# Patient Record
Sex: MALE | Race: WHITE | NOT HISPANIC OR LATINO
[De-identification: names, ages, dates, MRNs, and addresses within clinical notes are randomized per-mention and may not be internally consistent; named-entity substitution may affect disease eponyms.]

---

## 2017-05-02 PROBLEM — Z00.00 ENCOUNTER FOR PREVENTIVE HEALTH EXAMINATION: Status: ACTIVE | Noted: 2017-05-02

## 2017-05-03 ENCOUNTER — APPOINTMENT (OUTPATIENT)
Dept: OTOLARYNGOLOGY | Facility: CLINIC | Age: 42
End: 2017-05-03

## 2017-05-03 VITALS — BODY MASS INDEX: 27.04 KG/M2 | HEIGHT: 73 IN | WEIGHT: 204 LBS

## 2017-05-03 VITALS — DIASTOLIC BLOOD PRESSURE: 74 MMHG | SYSTOLIC BLOOD PRESSURE: 126 MMHG

## 2017-05-03 DIAGNOSIS — R13.19 OTHER DYSPHAGIA: ICD-10-CM

## 2017-05-03 DIAGNOSIS — Z80.9 FAMILY HISTORY OF MALIGNANT NEOPLASM, UNSPECIFIED: ICD-10-CM

## 2017-05-03 DIAGNOSIS — Z78.9 OTHER SPECIFIED HEALTH STATUS: ICD-10-CM

## 2017-05-03 DIAGNOSIS — Z86.39 PERSONAL HISTORY OF OTHER ENDOCRINE, NUTRITIONAL AND METABOLIC DISEASE: ICD-10-CM

## 2017-05-03 DIAGNOSIS — Z83.49 FAMILY HISTORY OF OTHER ENDOCRINE, NUTRITIONAL AND METABOLIC DISEASES: ICD-10-CM

## 2017-05-03 DIAGNOSIS — Z92.3 PERSONAL HISTORY OF IRRADIATION: ICD-10-CM

## 2017-05-03 DIAGNOSIS — H90.5 UNSPECIFIED SENSORINEURAL HEARING LOSS: ICD-10-CM

## 2017-06-23 ENCOUNTER — APPOINTMENT (OUTPATIENT)
Dept: OTOLARYNGOLOGY | Facility: HOSPITAL | Age: 42
End: 2017-06-23

## 2019-06-13 ENCOUNTER — APPOINTMENT (OUTPATIENT)
Dept: OTOLARYNGOLOGY | Facility: CLINIC | Age: 44
End: 2019-06-13
Payer: COMMERCIAL

## 2019-06-13 VITALS
SYSTOLIC BLOOD PRESSURE: 132 MMHG | OXYGEN SATURATION: 99 % | HEART RATE: 97 BPM | DIASTOLIC BLOOD PRESSURE: 80 MMHG | TEMPERATURE: 98.5 F

## 2019-06-13 VITALS — SYSTOLIC BLOOD PRESSURE: 123 MMHG | OXYGEN SATURATION: 97 % | HEART RATE: 77 BPM | DIASTOLIC BLOOD PRESSURE: 80 MMHG

## 2019-06-13 DIAGNOSIS — Z85.47 PERSONAL HISTORY OF MALIGNANT NEOPLASM OF TESTIS: ICD-10-CM

## 2019-06-13 PROCEDURE — 31575 DIAGNOSTIC LARYNGOSCOPY: CPT

## 2019-06-13 PROCEDURE — 99214 OFFICE O/P EST MOD 30 MIN: CPT | Mod: 25

## 2019-06-20 ENCOUNTER — APPOINTMENT (OUTPATIENT)
Dept: PULMONOLOGY | Facility: CLINIC | Age: 44
End: 2019-06-20
Payer: COMMERCIAL

## 2019-06-20 VITALS
TEMPERATURE: 98 F | RESPIRATION RATE: 12 BRPM | SYSTOLIC BLOOD PRESSURE: 110 MMHG | BODY MASS INDEX: 27.04 KG/M2 | WEIGHT: 204 LBS | HEART RATE: 69 BPM | HEIGHT: 73 IN | OXYGEN SATURATION: 98 % | DIASTOLIC BLOOD PRESSURE: 90 MMHG

## 2019-06-20 PROCEDURE — 99204 OFFICE O/P NEW MOD 45 MIN: CPT

## 2019-06-21 RX ORDER — LISINOPRIL 10 MG/1
10 TABLET ORAL
Refills: 0 | Status: ACTIVE | COMMUNITY

## 2019-06-21 RX ORDER — IBUPROFEN 800 MG/1
TABLET, FILM COATED ORAL
Refills: 0 | Status: DISCONTINUED | COMMUNITY
End: 2019-06-21

## 2019-06-21 RX ORDER — LAMOTRIGINE 100 MG/1
100 TABLET ORAL
Refills: 0 | Status: ACTIVE | COMMUNITY

## 2019-06-21 RX ORDER — METHYLPHENIDATE HYDROCHLORIDE 5 MG/1
5 TABLET ORAL
Refills: 0 | Status: DISCONTINUED | COMMUNITY
End: 2019-06-21

## 2019-06-22 NOTE — PHYSICAL EXAM
[General Appearance - Well Developed] : well developed [General Appearance - Well Nourished] : well nourished [III] : III [Abnormal Walk] : normal gait [Nail Clubbing] : no clubbing of the fingernails [Cyanosis, Localized] : no localized cyanosis [] : no rash [No Focal Deficits] : no focal deficits [Oriented To Time, Place, And Person] : oriented to person, place, and time [Heart Sounds] : normal S1 and S2 [Murmurs] : no murmurs present [FreeTextEntry1] : no articular manifestations of CVD [FreeTextEntry2] : no pedal edema ; mild tightness of calf muscles on right, no cords, negative Homen sign

## 2019-06-22 NOTE — ASSESSMENT
[FreeTextEntry1] : 6-20-19:\par It was a pleasure to meet Renny in consultation today. His respiratory issues are summarized:\par \par 1. Pulmonary emboli and thyroid surgery\par Renny woke up with new shortness of breath in March 2019 that continued for one month. He then had acute right-sided chest pain and went to urgent care, then [facility] ED. He was diagnosed with B/L pulmonary emboli on a CTA chest on 4/28/19.  He was started on anticoagulation and is currently on Eliquis 5 mg BID and follows with Dr. Chacko in hematology. \par He is discussing thryroidectomy with Dr. Varela.  Renny and I have discussed the risks of surgery in the setting of PE and anticoagulation.  For elective thyroidectomy, I would recommend that he hold off on getting the surgery until he has been on anticoagulation for at least 6 months.  Then, I would recommend that he transition to low molecular weight heparin perioperatively. We would stop LMWH the night before the surgery then restart him on LMWH within 12-24 hours post-operatively, depending on his bleeding risk. On post-op day 3 or 4, I would then transition LMWH back to Eliquis. \par He states the Dr. Chacko is planning to keep him on Eliquis for a total of 9 months then transition him to ASA.  He also notes that Dr. Chacko plans to do a hypercoagulability workup after he discontinues Eliquis. \par I typically like to keep patients on anticoagulation for one year, as there has been data to show that the risk of PE increased if anticoagulation is stopped after 6-12 months. \par He states the he is getting blood work done with Dr. Christian later today, so we will send a list of labs we'd like checked, including d-dimer, CBC, antiphospholipid Ab, anticardiolipin Ab, Factor V Leiden, and homocysteine and will request these to be done with his hematologist.\par We will also get an echo to assess his right-sided pressures, as well as a PFT and 6MWT.     I will call Dr. Chacko to discuss plan. \par \par 2. Opacities on CTA\par Renny's CTA from 4/28/19 shows that there is subpleural opacity with spiculation noted in the LLL adjacent to the diaphragm. I believe this is likely scar tissue from the pneumonia with pleurisy diagnosed in 2017. There is also a subpleural opacity at the RLL base 9.7 mm x 5.3 mm, mean 7.5 mm. There is ground-glass opacity at the right base posteriorly. We will follow this with with a low-dose CT chest in 6 months (end of October 2019). \par \par 3. RML lung cyst\par There is a 2.7 cm right middle lobe cyst seen on his CTA from 4/28/19. We will follow this on his October CT chest.\par \par 4. Health maintenance\par Received pneumococcal vaccination on 4/30/19 as per pt. \par \par Return 4 months

## 2019-06-22 NOTE — DISCUSSION/SUMMARY
[FreeTextEntry1] : ATTENDING SUMMARY \par 6-20-19 \par -no pallor or icterus \par -no oral thrush, mildly deviated nasal septum, narrowing of nasal passages, no discharge\par -P2 not loud or split\par -good air entry bilaterally, no wheezing, rhonchi or crackles \par -no articular manifestations of CVD\par -no pedal edema ; mild tightness of calf muscles on right, no cords, negative Homen sign\par

## 2019-06-22 NOTE — CONSULT LETTER
[Dear  ___] : Dear ~ASPEN, [Consult Letter:] : I had the pleasure of evaluating your patient, [unfilled]. [Please see my note below.] : Please see my note below. [Consult Closing:] : Thank you very much for allowing me to participate in the care of this patient.  If you have any questions, please do not hesitate to contact me. [Sincerely,] : Sincerely, [DrAmmon  ___] : Dr. GLASER [FreeTextEntry2] : Konstantin Varela MD [FreeTextEntry3] : Albin Luo MD

## 2019-06-22 NOTE — END OF VISIT
[>50% of Time Spent on Counseling and Coordination of Care for  ___] : Greater than 50% of the encounter time was spent on counseling and coordination of care for [unfilled] [Time Spent: ___ minutes] : I have spent [unfilled] minutes of face to face time with the patient [FreeTextEntry3] : All medical record entries made by the Scribe were at my, Dr. Albin Lou's direction and personally dictated by me.   I have reviewed the chart and agree that the record accurately reflects my personal performance of the history, physical exam, assessment and plan. I have also personally directed, reviewed, and agreed with the chart.

## 2019-06-22 NOTE — HISTORY OF PRESENT ILLNESS
[FreeTextEntry1] : Pt is a 44 yo M with PMH ADD, Lyme disease, multinodular goiter, pneumonia with pleurisy (2017), testicular cancer (2004), Schatsky's ring/gastritis (1998). Diagnosed with PE in April 2019. \par \milagros Woke up one morning at end of march and was short of breath. Throughout the next month was more short of breath for the next month. \par He had multiple ECGs and CXRs that were fine. Occasional CP when he was exerting himself. \milagros Then had acute CP, went to urgent care first, eventually went to ED ([facility]) and was diagnosed with PE in the ED on 4/28/19. \milagros Prior to diagnosis, was traveling to [place] every week, and also had trips to [facility] and [facility] in the months leading up to his diagnosis. \par He did have some lower extremity joint pain, but no pain or swelling of the calves.\par Denies testosterone supplementation.\par No previous blood clots.\par No FHx blood clots.\par Follows with Dr. Chacko in hematology, who is overseeing his anticoagulation.  He is on Eliquis 5 mg BID.  \par \par Dr. Varela is planning for thyroidectomy for multinodular goiter. Surgical date not yet set but pt will require clearance.

## 2019-10-08 ENCOUNTER — FORM ENCOUNTER (OUTPATIENT)
Age: 44
End: 2019-10-08

## 2019-10-08 ENCOUNTER — APPOINTMENT (OUTPATIENT)
Dept: OTOLARYNGOLOGY | Facility: CLINIC | Age: 44
End: 2019-10-08
Payer: COMMERCIAL

## 2019-10-08 VITALS
OXYGEN SATURATION: 98 % | SYSTOLIC BLOOD PRESSURE: 126 MMHG | HEART RATE: 87 BPM | DIASTOLIC BLOOD PRESSURE: 83 MMHG | TEMPERATURE: 98.4 F

## 2019-10-08 PROCEDURE — 99214 OFFICE O/P EST MOD 30 MIN: CPT | Mod: 25

## 2019-10-08 PROCEDURE — 31575 DIAGNOSTIC LARYNGOSCOPY: CPT

## 2019-10-08 NOTE — CONSULT LETTER
[Dear  ___] : Dear  [unfilled], [Please see my note below.] : Please see my note below. [Consult Closing:] : Thank you very much for allowing me to participate in the care of this patient.  If you have any questions, please do not hesitate to contact me. [Consult Letter:] : I had the pleasure of evaluating your patient, [unfilled]. [Sincerely,] : Sincerely, [DrAmmon  ___] : Dr. GLASER [[facility]] : [facility]  [Konstantin Varela MD, FACS] : Konstantin Varela MD, FACS [Director] : Director [[facility]] : [facility] [[facility]] : [facility]  [FreeTextEntry3] : \par Konstantin Varela M.D., FACS, ECNU\par Director [facility]\par [facility]\par Certified in Thyroid/Parathyroid/Neck Ultrasound, ECNU/ AIUM\par \par , Department of Otolaryngology\par [facility] [employer] at [facility]\par

## 2019-10-08 NOTE — HISTORY OF PRESENT ILLNESS
[de-identified] : Renny is a generally healthy 41-year-old male  who had a left thyroid lobectomy at the age of 15, for a presumed goiter and not malignant. soon after his thyroidectomy he started taking Levothyroxine for unclear reasons but over the years has continued taking it and there has been progressive growth of the right thyroid lobe with increasing symptoms related to pressure with a globus sensation.  He denies true dysphagia but had an upper GI endoscopy for severe GERD 2 years ago and found to have a Schatzki's ring that was dilated and he had another dilation 6 months ago. He denies recent voice changes, significant shortness of breath other than related to deconditioning. He snores but denies excessive daytime sleepiness.  He is taking Ritalin for ADD.  He is able to lie flat without dyspnea.  He denies any pain in the neck but has a sense of pressure discomfort that occurs regularly ans especially when swallowing.  His weight has been stable and he is not sure if there had been more growth of the gland after stopping LT4 in 2015 for ~ 1 year.  His TSH has become more suppressed with the growth and is now near the lower limit of normal with a normal free T4 on last blood testing.   He noted that after his last US of the neck on 03/28/17, he had exacerbation of symptoms with severe pressure sensation and was found to have a right thyroid lobe measuring 9.5 x 2.9 x 5 CM. There is a probable cluster of solid and cystic nodules in the mid to lower pole measuring 3.2 x 2.5 x 2.7 CM as well as an exophytic lower pole nodule extending to the left of midline measuring 7 x 3.3 x 6.8 CM. There has been significant growth since he was last evaluated previously measuring 5.9 x 3.4 for the cluster of nodules in the lower pole and 3.6 x 2.6 CM for the exophytic lower pole nodule. his mother has thyroid nodules that are being followed and his grandmother maternal grandmother had her thyroid removed presumably for goiter but he is not aware of any family history of thyroid cancer. He has no known radiation exposure is ankle up in [facility] not near a nuclear power plant.  However, he was treated for testicular cancer in 2004 with a radical orchiectomy and pelvic radiation.  He has congenital deafness AS.   \par  [FreeTextEntry1] : Renny returns after he was scheduled to have a completion thyroidectomy for a right sided goiter in 2017 but needed a cardiac stress test and this took longer than expected.  He ultimately deferred the operation because of a busy travel work schedule.  He developed SOB in April and was found to have multiple pulmonary emboli of uncertain etiology on CT angio. 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  He was ultimately treated with anticoagulants (Elaquis).  His stress test in 2017 was normal.  He also is being treated for Lyme disease with oral antibiotics.  He had a full hematologic w/u and no coagulopathy identified.   He is also about to be evaluated by a pulmonologist.  A metastatic disease w/u (for testicular cancer) with cross-sectional imaging is in progress. CT scan tomorrow.  He currently denies SOB but continues to have a globus sensation. He has not had a f/u neck CT or ultrasound. He is still having a globus sensation and describes a pressure sensation in the throat.

## 2019-10-08 NOTE — REASON FOR VISIT
[FreeTextEntry2] : growth of the right thyroid lobe after prior partial thyroidectomy, substernal extension and compressive symptoms.  [FreeTextEntry1] : Referred by Diego Neri MD Endocrinologist,  PCP Brayden Hess MD

## 2019-10-08 NOTE — CONSULT LETTER
[Dear  ___] : Dear  [unfilled], [Consult Letter:] : I had the pleasure of evaluating your patient, [unfilled]. [Please see my note below.] : Please see my note below. [Consult Closing:] : Thank you very much for allowing me to participate in the care of this patient.  If you have any questions, please do not hesitate to contact me. [Sincerely,] : Sincerely, [DrAmmon  ___] : Dr. GLASER [Konstantin Varela MD, FACS] : Konstantin Varela MD, FACS [[facility]] : [facility]  [Director] : Director [[facility]] : [facility] [[facility]] : [facility]  [FreeTextEntry3] : \par Konstantin Varela M.D., FACS, ECNU\par Director [facility]\par [facility]\par Certified in Thyroid/Parathyroid/Neck Ultrasound, ECNU/ AIUM\par \par , Department of Otolaryngology\par [facility] at [facility]\par

## 2019-10-08 NOTE — DATA REVIEWED
[de-identified] : see history of present illness [de-identified] : see history of present illness [de-identified] : Discharge notes from [facility] [de-identified] : see HPI

## 2019-10-08 NOTE — DATA REVIEWED
[de-identified] : see history of present illness [de-identified] : see history of present illness [de-identified] : see HPI [de-identified] : Discharge notes from [facility]

## 2019-10-08 NOTE — PROCEDURE
[Image(s) Captured] : image(s) captured and filed [Unable to Cooperate with Mirror] : patient unable to cooperate with mirror [Gag Reflex] : gag reflex preventing mirror examination [Complicated Symptoms] : complicated symptoms requiring more thorough examination than provided by mirror [Globus] : globus [Topical Lidocaine] : topical lidocaine [Flexible Endoscope] : examined with the flexible endoscope [Serial Number: ___] : Serial Number: [unfilled] [de-identified] : The nasal septum is minimally deviated to the left. There are no masses or polyps and the nasal mucosa and secretions are normal. The choanae and posterior nasopharynx are normal without masses or drainage. The Eustachian tube orifices appear patent. The pharynx, including the posterior and lateral pharyngeal walls, the vallecula and base of tongue are normal without ulcerations, lesions or masses. The hypopharynx including the pyriform sinuses open well without pooling of secretions, mucosal lesions or masses. The supraglottic larynx including the epiglottis, petiole, glossoepiglottic folds and pharyngoepiglottic folds are normal without mucosal lesions, ulcerations or masses. The glottis reveals normal false vocal folds. The true vocal folds are glistening white, tense and of equal length, without paralysis, having symmetric mobility on adduction and abduction. There are no mucosal lesions, nodules, cysts, erythroplasia or leukoplakia. The posterior cricoid area has healthy pink mucosa in the interarytenoid area and esophageal inlet. There is slight thickening/edema of the interarytenoid mucosa or erythema suggestive of posterior laryngitis from laryngopharyngeal acid reflux disease. The trachea is pushed posteriorly but without narrowing in the immediate subglottis. His upper airway is patent. \par  [de-identified] : a goiter with substernal extension and compressive symptoms involving the right thyroid lobe.

## 2019-10-08 NOTE — CONSULT LETTER
[Dear  ___] : Dear  [unfilled], [Please see my note below.] : Please see my note below. [Consult Letter:] : I had the pleasure of evaluating your patient, [unfilled]. [Consult Closing:] : Thank you very much for allowing me to participate in the care of this patient.  If you have any questions, please do not hesitate to contact me. [Sincerely,] : Sincerely, [DrAmmon  ___] : Dr. GLASER [[facility]] : [facility]  [Konstantin Varela MD, FACS] : Konstantin Varela MD, FACS [Director] : Director [[facility]] : [facility]  [[facility]] : [facility] [FreeTextEntry3] : \par Konstantin Varela M.D., FACS, ECNU\par Director [facility]\par [facility] [place] [facility]\par Certified in Thyroid/Parathyroid/Neck Ultrasound, ECNU/ AIUM\par \par , Department of Otolaryngology\par [facility] [employer] [facility] at [facility]\par

## 2019-10-08 NOTE — DATA REVIEWED
[de-identified] : see history of present illness [de-identified] : see history of present illness [de-identified] : see HPI

## 2019-10-08 NOTE — HISTORY OF PRESENT ILLNESS
[de-identified] : Renny is a generally healthy 41-year-old male  who had a left thyroid lobectomy at the age of 15, for a presumed goiter and not malignant. soon after his thyroidectomy he started taking Levothyroxine for unclear reasons but over the years has continued taking it and there has been progressive growth of the right thyroid lobe with increasing symptoms related to pressure with a globus sensation.  He denies true dysphagia but had an upper GI endoscopy for severe GERD 2 years ago and found to have a Schatzki's ring that was dilated and he had another dilation 6 months ago. He denies recent voice changes, significant shortness of breath other than related to deconditioning. He snores but denies excessive daytime sleepiness.  He is taking Ritalin for ADD.  He is able to lie flat without dyspnea.  He denies any pain in the neck but has a sense of pressure discomfort that occurs regularly ans especially when swallowing.  His weight has been stable and he is not sure if there had been more growth of the gland after stopping LT4 in 2015 for ~ 1 year.  His TSH has become more suppressed with the growth and is now near the lower limit of normal with a normal free T4 on last blood testing.   He noted that after his last US of the neck on 03/28/17, he had exacerbation of symptoms with severe pressure sensation and was found to have a right thyroid lobe measuring 9.5 x 2.9 x 5 CM. There is a probable cluster of solid and cystic nodules in the mid to lower pole measuring 3.2 x 2.5 x 2.7 CM as well as an exophytic lower pole nodule extending to the left of midline measuring 7 x 3.3 x 6.8 CM. There has been significant growth since he was last evaluated previously measuring 5.9 x 3.4 for the cluster of nodules in the lower pole and 3.6 x 2.6 CM for the exophytic lower pole nodule. his mother has thyroid nodules that are being followed and his grandmother maternal grandmother had her thyroid removed presumably for goiter but he is not aware of any family history of thyroid cancer. He has no known radiation exposure is ankle up in [facility] not near a nuclear power plant.  However, he was treated for testicular cancer in 2004 with a radical orchiectomy and pelvic radiation.  He has congenital deafness AS.   \par  [FreeTextEntry1] : Renny returns after he was scheduled to have a completion thyroidectomy for a right sided goiter in 2017 but needed a cardiac stress test and this took longer than expected.  He ultimately deferred the operation because of a busy travel work schedule.  He developed SOB in April and was found to have multiple pulmonary emboli of uncertain etiology on CT angio. 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  He was ultimately treated with anticoagulants (Elaquis).  His stress test in 2017 was normal.  He also is being treated for Lyme disease with oral antibiotics.  He had a full hematologic w/u and no coagulopathy identified.   He is also about to be evaluated by a pulmonologist.  A metastatic disease w/u (for testicular cancer) with cross-sectional imaging is in progress. CT scan tomorrow.  He currently denies SOB but continues to have a globus sensation. He has not had a f/u neck CT or ultrasound. He is still having a globus sensation and describes a pressure sensation in the throat.

## 2019-10-08 NOTE — REASON FOR VISIT
[Initial Consultation] : an initial consultation for [FreeTextEntry2] : growth of the right thyroid lobe after prior partial thyroidectomy, substernal extension and compressive symptoms.  [FreeTextEntry1] : Referred by Diego Neri MD Endocrinologist,  PCP Brayden Hess MD

## 2019-10-08 NOTE — PROCEDURE
[Image(s) Captured] : image(s) captured and filed [Complicated Symptoms] : complicated symptoms requiring more thorough examination than provided by mirror [Globus] : globus [Topical Lidocaine] : topical lidocaine [Flexible Endoscope] : examined with the flexible endoscope [Serial Number: ___] : Serial Number: [unfilled] [Unable to Cooperate with Mirror] : patient unable to cooperate with mirror [Gag Reflex] : gag reflex preventing mirror examination [de-identified] : The nasal septum is minimally deviated to the left. There are no masses or polyps and the nasal mucosa and secretions are normal. The choanae and posterior nasopharynx are normal without masses or drainage. The Eustachian tube orifices appear patent. The pharynx, including the posterior and lateral pharyngeal walls, the vallecula and base of tongue are normal without ulcerations, lesions or masses. The hypopharynx including the pyriform sinuses open well without pooling of secretions, mucosal lesions or masses. The supraglottic larynx including the epiglottis, petiole, glossoepiglottic folds and pharyngoepiglottic folds are normal without mucosal lesions, ulcerations or masses. The glottis reveals normal false vocal folds. The true vocal folds are glistening white, tense and of equal length, without paralysis, having symmetric mobility on adduction and abduction. There are no mucosal lesions, nodules, cysts, erythroplasia or leukoplakia. The posterior cricoid area has healthy pink mucosa in the interarytenoid area and esophageal inlet. There is mild thickening/edema of the interarytenoid mucosa or erythema suggestive of posterior laryngitis from laryngopharyngeal acid reflux disease. The trachea is pushed posteriorly but without narrowing in the immediate subglottis.\par  [de-identified] : a goiter with substernal extension and compressive symptoms  involving the right thyroid lobe.

## 2019-10-08 NOTE — PROCEDURE
[Image(s) Captured] : image(s) captured and filed [Complicated Symptoms] : complicated symptoms requiring more thorough examination than provided by mirror [Topical Lidocaine] : topical lidocaine [Globus] : globus [Flexible Endoscope] : examined with the flexible endoscope [Serial Number: ___] : Serial Number: [unfilled] [Gag Reflex] : gag reflex preventing mirror examination [Unable to Cooperate with Mirror] : patient unable to cooperate with mirror [de-identified] : The nasal septum is minimally deviated to the left. There are no masses or polyps and the nasal mucosa and secretions are normal. The choanae and posterior nasopharynx are normal without masses or drainage. The Eustachian tube orifices appear patent. The pharynx, including the posterior and lateral pharyngeal walls, the vallecula and base of tongue are normal without ulcerations, lesions or masses. The hypopharynx including the pyriform sinuses open well without pooling of secretions, mucosal lesions or masses. The supraglottic larynx including the epiglottis, petiole, glossoepiglottic folds and pharyngoepiglottic folds are normal without mucosal lesions, ulcerations or masses. The glottis reveals normal false vocal folds. The true vocal folds are glistening white, tense and of equal length, without paralysis, having symmetric mobility on adduction and abduction. There are no mucosal lesions, nodules, cysts, erythroplasia or leukoplakia. The posterior cricoid area has healthy pink mucosa in the interarytenoid area and esophageal inlet. There is mild thickening/edema of the interarytenoid mucosa or erythema suggestive of posterior laryngitis from laryngopharyngeal acid reflux disease. The trachea is pushed posteriorly but without narrowing in the immediate subglottis.\par  [de-identified] : a goiter with substernal extension and compressive symptoms  involving the right thyroid lobe.

## 2019-10-08 NOTE — HISTORY OF PRESENT ILLNESS
[de-identified] : Renny is a generally healthy 41-year-old male  who had a left thyroid lobectomy at the age of 15, for a presumed goiter and not malignant. soon after his thyroidectomy he started taking Levothyroxine for unclear reasons but over the years has continued taking it and there has been progressive growth of the right thyroid lobe with increasing symptoms related to pressure with a globus sensation.  He denies true dysphagia but had an upper GI endoscopy for severe GERD 2 years ago and found to have a Schatzki's ring that was dilated and he had another dilation 6 months ago. He denies recent voice changes, significant shortness of breath other than related to deconditioning. He snores but denies excessive daytime sleepiness.  He is taking Ritalin for ADD.  He is able to lie flat without dyspnea.  He denies any pain in the neck but has a sense of pressure discomfort that occurs regularly ans especially when swallowing.  His weight has been stable and he is not sure if there had been more growth of the gland after stopping LT4 in 2015 for ~ 1 year.  His TSH has become more suppressed with the growth and is now near the lower limit of normal with a normal free T4 on last blood testing.   He noted that after his last US of the neck on 03/28/17, he had exacerbation of symptoms with severe pressure sensation and was found to have a right thyroid lobe measuring 9.5 x 2.9 x 5 CM. There is a probable cluster of solid and cystic nodules in the mid to lower pole measuring 3.2 x 2.5 x 2.7 CM as well as an exophytic lower pole nodule extending to the left of midline measuring 7 x 3.3 x 6.8 CM. There has been significant growth since he was last evaluated previously measuring 5.9 x 3.4 for the cluster of nodules in the lower pole and 3.6 x 2.6 CM for the exophytic lower pole nodule. his mother has thyroid nodules that are being followed and his grandmother maternal grandmother had her thyroid removed presumably for goiter but he is not aware of any family history of thyroid cancer. He has no known radiation exposure is ankle up in [facility] not near a nuclear power plant.  However, he was treated for testicular cancer in 2004 with a radical orchiectomy and pelvic radiation.  He has congenital deafness AS.   \par  [FreeTextEntry1] : Renny returns after he was scheduled to have a completion thyroidectomy for a right sided goiter in 2017 but needed a cardiac stress test and this took longer than expected.  He ultimately deferred the operation because of a busy travel work schedule.  He developed SOB in April and was found to have multiple pulmonary emboli of uncertain etiology on CT angio. 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  He was ultimately treated with anticoagulants (Elaquis).  His stress test in 2017 was normal.  He also was treated for Lyme disease with oral antibiotics.  He had a full hematologic w/u and no coagulopathy identified.  He is saw a pulmonologist to rule out metastatic disease w/u (for testicular cancer) with CT cross-sectional imaging.  There were no concerning findings and he is being monitored.  He is having PFTs soon to determine if there is extrinsic obstruction.   He currently denies SOB but continues to have a globus sensation.  He is still having a globus sensation and describes a pressure sensation in the throat. He has intermittent GERD managed with a PPI.

## 2019-10-09 ENCOUNTER — RESULT REVIEW (OUTPATIENT)
Age: 44
End: 2019-10-09

## 2019-10-09 ENCOUNTER — OUTPATIENT (OUTPATIENT)
Dept: OUTPATIENT SERVICES | Facility: HOSPITAL | Age: 44
LOS: 1 days | End: 2019-10-09
Payer: COMMERCIAL

## 2019-10-09 DIAGNOSIS — I26.99 OTHER PULMONARY EMBOLISM WITHOUT ACUTE COR PULMONALE: ICD-10-CM

## 2019-10-09 PROCEDURE — 93306 TTE W/DOPPLER COMPLETE: CPT

## 2019-10-09 PROCEDURE — 93306 TTE W/DOPPLER COMPLETE: CPT | Mod: 26

## 2019-11-12 ENCOUNTER — FORM ENCOUNTER (OUTPATIENT)
Age: 44
End: 2019-11-12

## 2019-11-13 ENCOUNTER — RESULT REVIEW (OUTPATIENT)
Age: 44
End: 2019-11-13

## 2019-11-13 ENCOUNTER — APPOINTMENT (OUTPATIENT)
Dept: CT IMAGING | Facility: CLINIC | Age: 44
End: 2019-11-13
Payer: COMMERCIAL

## 2019-11-13 ENCOUNTER — OUTPATIENT (OUTPATIENT)
Dept: OUTPATIENT SERVICES | Facility: HOSPITAL | Age: 44
LOS: 1 days | End: 2019-11-13

## 2019-11-13 PROCEDURE — 71250 CT THORAX DX C-: CPT | Mod: 26

## 2019-11-21 ENCOUNTER — APPOINTMENT (OUTPATIENT)
Dept: PULMONOLOGY | Facility: CLINIC | Age: 44
End: 2019-11-21
Payer: COMMERCIAL

## 2019-11-21 VITALS
SYSTOLIC BLOOD PRESSURE: 102 MMHG | HEART RATE: 104 BPM | DIASTOLIC BLOOD PRESSURE: 80 MMHG | BODY MASS INDEX: 27.65 KG/M2 | OXYGEN SATURATION: 98 % | HEIGHT: 73 IN | TEMPERATURE: 98.1 F | WEIGHT: 208.6 LBS

## 2019-11-21 DIAGNOSIS — Z23 ENCOUNTER FOR IMMUNIZATION: ICD-10-CM

## 2019-11-21 DIAGNOSIS — R09.81 NASAL CONGESTION: ICD-10-CM

## 2019-11-21 PROCEDURE — 99215 OFFICE O/P EST HI 40 MIN: CPT | Mod: 25

## 2019-11-21 PROCEDURE — 94060 EVALUATION OF WHEEZING: CPT

## 2019-11-21 PROCEDURE — G0008: CPT

## 2019-11-21 PROCEDURE — 94727 GAS DIL/WSHOT DETER LNG VOL: CPT

## 2019-11-21 PROCEDURE — 94729 DIFFUSING CAPACITY: CPT

## 2019-11-21 PROCEDURE — 90686 IIV4 VACC NO PRSV 0.5 ML IM: CPT

## 2019-11-21 RX ORDER — FLUTICASONE PROPIONATE 50 UG/1
50 SPRAY, METERED NASAL TWICE DAILY
Qty: 3 | Refills: 1 | Status: ACTIVE | COMMUNITY
Start: 2019-11-21 | End: 1900-01-01

## 2019-11-23 NOTE — HISTORY OF PRESENT ILLNESS
[Difficulty Breathing During Exertion] : denies dyspnea on exertion [Feelings Of Weakness On Exertion] : denies exercise intolerance [Cough] : denies coughing [Wheezing] : denies wheezing [Chest Pain Or Discomfort] : denies chest pain [Fever] : denies fever [FreeTextEntry1] : Pt is a 44 yo M with PMH ADD, Lyme disease, multinodular goiter, pneumonia with pleurisy (2017), testicular cancer (2004), Schatsky's ring/gastritis (1998). Diagnosed with PE in April 2019. \par \par Initial visit 6/20/19: Woke up one morning at end of march and was short of breath. Throughout the next month was more short of breath for the next month. \par He had multiple ECGs and CXRs that were fine. Occasional CP when he was exerting himself. \par Then had acute CP, went to urgent care first, eventually went to ED ([facility]) and was diagnosed with PE in the ED on 4/28/19. \par Prior to diagnosis, was traveling to [place] every week, and also had trips to [facility] in the months leading up to his diagnosis. \par He did have some lower extremity joint pain, but no pain or swelling of the calves.\par Denies testosterone supplementation.\par No previous blood clots.\par No FHx blood clots.\par Follows with Dr. Chacko in hematology, who is overseeing his anticoagulation.  He is on Eliquis 5 mg BID.  \par Dr. Varela is planning for thyroidectomy for multinodular goiter. Surgical date not yet set but pt will require clearance. \par \par 11/21/19:\par Denies SOB. He can walk to the 8th floor quickly, will be winded at the top. He was not able to do this as easily in the past. He can walk as much as he'd like on a flat surface before he gets winded; his back is more of a limitation to exercise.  He is undergoing PT. Denies chest pain. \par Denies cough, chest tightness, wheezing, fever, respiratory tract infections. \par

## 2019-11-23 NOTE — CONSULT LETTER
[Dear  ___] : Dear ~ASPEN, [Consult Letter:] : I had the pleasure of evaluating your patient, [unfilled]. [Please see my note below.] : Please see my note below. [Consult Closing:] : Thank you very much for allowing me to participate in the care of this patient.  If you have any questions, please do not hesitate to contact me. [Sincerely,] : Sincerely, [DrAmmon  ___] : Dr. GLASER [FreeTextEntry2] : Konstantin Varela MD [FreeTextEntry3] : Albin Lou MD

## 2019-11-23 NOTE — PROCEDURE
[FreeTextEntry1] : PFT and 6MWT 19:\par FVC: 6.34 L (115%) --> 6.58 L (120%)\par FEV1: 5.26 L (118%) --> 5.41 L (121%)\par FEV1/FVC: 83% --> 82%\par QNY71-73%: 6.01 L/s (132%) --> 5.83 L/s (128%)\par T.98 L (105%)\par RV/T%\par DLCO: 31.9 (107%)\par 6MWT: 732 meters, SpO2 start: 99% --> SpO2 end: 97%\par Normal spirometry. No obstruction. Normal lung volumes. Normal diffusion capacity. Normal walk distance with no desaturation. \par EXAM: CT CHEST PROCEDURE DATE: 2019 \par FINDINGS: CT of the chest was performed without the administration of intravenous contrast. Reconstructions were performed in the sagittal and coronal planes. Comparison is made to prior study dated 2019. \par Evaluation of the chest demonstrates marked nodular heterogeneous enlargement of the thyroid gland with multiple calcified nodules, unchanged since 2019. Negative for thoracic, axillary, mediastinal or hilar \par adenopathy. There is no pleural or pericardial effusion. Evaluation of the lung parenchyma demonstrates no pulmonary consolidation or mass. No discrete pulmonary nodule. No endobronchial lesion. No suspicious pulmonary finding. Stable subpleural bleb within the right middle lobe. Interval resolution of \par previously demonstrated subpleural opacity within the right lower lobe. No discrete nodule within the right middle lobe. No endobronchial lesion. \par Evaluation of the unenhanced upper abdomen demonstrates a probable tiny cyst within the right lobe of the liver, unchanged since 2019. Arterial vascular calcification. Trace hiatal hernia. Evaluation of the osseous structures are unremarkable. \par IMPRESSION: No suspicious pulmonary finding. \par \par EXAM: ECHOCARDIOGRAM (CARDIOL) PROCEDURE DATE: 10/09/2019 \par 1. There is trace tricuspid regurgitation. Pulmonary artery systolic pressure (estimated using the tricuspid regurgitant gradient and an estimate of right atrial pressure) is 24.3 mmHg. \par  2. The right ventricle is mildly dilated. Normal RV systolic function. \par  3. The left ventricle is normal in size, wall thickness, and systolic function with a calculated ejection fraction of 61.0 %. \par  4. There is a trivial sized pericardial effusion without echocardiographic evidence of cardiac tamponade.

## 2019-11-23 NOTE — ASSESSMENT
[FreeTextEntry1] : 11-21-19\par It was a pleasure to meet Renny in consultation today. His respiratory issues are summarized:\par \par 1. Pulmonary emboli and thyroid surgery\par Renny woke up with new shortness of breath in March 2019 that continued for one month. He then had acute right-sided chest pain and went to urgent care, then [facility] ED. He was diagnosed with B/L pulmonary emboli on a CTA chest on 4/28/19.  He was started on anticoagulation and is currently on Eliquis 5 mg BID and follows with Dr. Chacko in hematology. \par He is discussing thryroidectomy with Dr. Varela.  Renny and I have discussed the risks of surgery in the setting of PE and anticoagulation.  For elective thyroidectomy, I would recommend that he hold off on getting the surgery until he has been on anticoagulation for at least 6 months.  Then, I would recommend that he transition to low molecular weight heparin perioperatively. We would stop LMWH the night before the surgery then restart him on LMWH within 12-24 hours post-operatively, depending on his bleeding risk. On post-op day 3 or 4, I would then transition LMWH back to Eliquis. \par He states the Dr. Chacko is planning to keep him on Eliquis for a total of 9 months then transition him to ASA.  He also notes that Dr. Chacko plans to do a hypercoagulability workup after he discontinues Eliquis. \par He states the he is getting blood work done with Dr. Christian later today, so we will send a list of labs we'd like checked, including d-dimer, CBC, antiphospholipid Ab, anticardiolipin Ab, Factor V Leiden, and homocysteine and will request these to be done with his hematologist.\par We will also get an echo to assess his right-sided pressures, as well as a PFT and 6MWT.     I will call Dr. Chacko to discuss plan. \par \par 2. Opacities on CTA\par Renny's CTA from 4/28/19 shows that there is subpleural linear (two branches) opacities with spiculation noted in the LLL adjacent to the diaphragm. I believe this is likely scar tissue from the pneumonia with pleurisy diagnosed in 2017. There was also a subpleural opacity at the RLL base 9.7 mm x 5.3 mm, mean 7.5 mm on the prior CT scan, which is not visible now.  It was probably an infectious or inflammatory process.\par \par 3. RML lung cyst\par There is a 2.7 cm right middle lobe cyst seen on his CTA from 4/28/19. We will follow this annually.\par \par 4. Health maintenance\par Received pneumococcal vaccination on 4/30/19 as per pt. \par \par Return 4 months

## 2019-11-23 NOTE — PHYSICAL EXAM
[General Appearance - Well Developed] : well developed [General Appearance - Well Nourished] : well nourished [III] : III [Heart Sounds] : normal S1 and S2 [Murmurs] : no murmurs present [Abnormal Walk] : normal gait [Nail Clubbing] : no clubbing of the fingernails [Cyanosis, Localized] : no localized cyanosis [] : no rash [No Focal Deficits] : no focal deficits [Oriented To Time, Place, And Person] : oriented to person, place, and time [FreeTextEntry2] : no pedal edema ; mild tightness of calf muscles on right, no cords, negative Homen sign [FreeTextEntry1] : no articular manifestations of CVD

## 2019-11-23 NOTE — DISCUSSION/SUMMARY
[FreeTextEntry1] : ATTENDING SUMMARY \par 11-21-19\par -no pallor or icterus\par -no cervical adenopathy\par -MP 3, no oral thrush\par -nasal mucosa very inflamed, nolan left nostril \par -surgical scar at base of neck, thyroid palpable on right side \par -no dullness, good air entry bilaterally, no wheezing, rhonchi or crackles \par -tachycardic to 104; normal S1, S2\par -no cyanosis or clubbing

## 2019-11-23 NOTE — REVIEW OF SYSTEMS
[Negative] : Respiratory [Dyspnea] : no dyspnea [Chest Tightness] : no chest tightness [Cough] : no cough [Wheezing] : no wheezing

## 2020-01-25 ENCOUNTER — EMERGENCY (EMERGENCY)
Facility: HOSPITAL | Age: 45
LOS: 1 days | Discharge: HOME | End: 2020-01-25
Attending: EMERGENCY MEDICINE | Admitting: EMERGENCY MEDICINE
Payer: COMMERCIAL

## 2020-01-25 VITALS
SYSTOLIC BLOOD PRESSURE: 128 MMHG | OXYGEN SATURATION: 100 % | HEIGHT: 73 IN | DIASTOLIC BLOOD PRESSURE: 75 MMHG | WEIGHT: 207.9 LBS | RESPIRATION RATE: 18 BRPM | HEART RATE: 71 BPM | TEMPERATURE: 98 F

## 2020-01-25 VITALS
OXYGEN SATURATION: 98 % | SYSTOLIC BLOOD PRESSURE: 116 MMHG | DIASTOLIC BLOOD PRESSURE: 74 MMHG | TEMPERATURE: 98 F | HEART RATE: 66 BPM | RESPIRATION RATE: 17 BRPM

## 2020-01-25 LAB
ALBUMIN SERPL ELPH-MCNC: 4.6 G/DL — SIGNIFICANT CHANGE UP (ref 3.3–5)
ALP SERPL-CCNC: 62 U/L — SIGNIFICANT CHANGE UP (ref 40–120)
ALT FLD-CCNC: 18 U/L — SIGNIFICANT CHANGE UP (ref 10–45)
ANION GAP SERPL CALC-SCNC: 11 MMOL/L — SIGNIFICANT CHANGE UP (ref 5–17)
APTT BLD: 32.6 SEC — SIGNIFICANT CHANGE UP (ref 27.5–36.3)
AST SERPL-CCNC: 14 U/L — SIGNIFICANT CHANGE UP (ref 10–40)
BASOPHILS # BLD AUTO: 0.02 K/UL — SIGNIFICANT CHANGE UP (ref 0–0.2)
BASOPHILS NFR BLD AUTO: 0.4 % — SIGNIFICANT CHANGE UP (ref 0–2)
BILIRUB SERPL-MCNC: 0.4 MG/DL — SIGNIFICANT CHANGE UP (ref 0.2–1.2)
BUN SERPL-MCNC: 14 MG/DL — SIGNIFICANT CHANGE UP (ref 7–23)
CALCIUM SERPL-MCNC: 9.2 MG/DL — SIGNIFICANT CHANGE UP (ref 8.4–10.5)
CHLORIDE SERPL-SCNC: 101 MMOL/L — SIGNIFICANT CHANGE UP (ref 96–108)
CO2 SERPL-SCNC: 29 MMOL/L — SIGNIFICANT CHANGE UP (ref 22–31)
CREAT SERPL-MCNC: 1.24 MG/DL — SIGNIFICANT CHANGE UP (ref 0.5–1.3)
D DIMER BLD IA.RAPID-MCNC: <150 NG/ML DDU — SIGNIFICANT CHANGE UP
EOSINOPHIL # BLD AUTO: 0.1 K/UL — SIGNIFICANT CHANGE UP (ref 0–0.5)
EOSINOPHIL NFR BLD AUTO: 1.8 % — SIGNIFICANT CHANGE UP (ref 0–6)
GLUCOSE SERPL-MCNC: 94 MG/DL — SIGNIFICANT CHANGE UP (ref 70–99)
HCT VFR BLD CALC: 44.2 % — SIGNIFICANT CHANGE UP (ref 39–50)
HGB BLD-MCNC: 14.6 G/DL — SIGNIFICANT CHANGE UP (ref 13–17)
IMM GRANULOCYTES NFR BLD AUTO: 0.2 % — SIGNIFICANT CHANGE UP (ref 0–1.5)
INR BLD: 1.13 — SIGNIFICANT CHANGE UP (ref 0.88–1.16)
LYMPHOCYTES # BLD AUTO: 1.54 K/UL — SIGNIFICANT CHANGE UP (ref 1–3.3)
LYMPHOCYTES # BLD AUTO: 28.5 % — SIGNIFICANT CHANGE UP (ref 13–44)
MCHC RBC-ENTMCNC: 28.5 PG — SIGNIFICANT CHANGE UP (ref 27–34)
MCHC RBC-ENTMCNC: 33 GM/DL — SIGNIFICANT CHANGE UP (ref 32–36)
MCV RBC AUTO: 86.2 FL — SIGNIFICANT CHANGE UP (ref 80–100)
MONOCYTES # BLD AUTO: 0.61 K/UL — SIGNIFICANT CHANGE UP (ref 0–0.9)
MONOCYTES NFR BLD AUTO: 11.3 % — SIGNIFICANT CHANGE UP (ref 2–14)
NEUTROPHILS # BLD AUTO: 3.13 K/UL — SIGNIFICANT CHANGE UP (ref 1.8–7.4)
NEUTROPHILS NFR BLD AUTO: 57.8 % — SIGNIFICANT CHANGE UP (ref 43–77)
NRBC # BLD: 0 /100 WBCS — SIGNIFICANT CHANGE UP (ref 0–0)
PLATELET # BLD AUTO: 250 K/UL — SIGNIFICANT CHANGE UP (ref 150–400)
POTASSIUM SERPL-MCNC: 3.7 MMOL/L — SIGNIFICANT CHANGE UP (ref 3.5–5.3)
POTASSIUM SERPL-SCNC: 3.7 MMOL/L — SIGNIFICANT CHANGE UP (ref 3.5–5.3)
PROT SERPL-MCNC: 7.2 G/DL — SIGNIFICANT CHANGE UP (ref 6–8.3)
PROTHROM AB SERPL-ACNC: 12.9 SEC — SIGNIFICANT CHANGE UP (ref 10–12.9)
RBC # BLD: 5.13 M/UL — SIGNIFICANT CHANGE UP (ref 4.2–5.8)
RBC # FLD: 12.6 % — SIGNIFICANT CHANGE UP (ref 10.3–14.5)
SODIUM SERPL-SCNC: 141 MMOL/L — SIGNIFICANT CHANGE UP (ref 135–145)
TROPONIN T SERPL-MCNC: <0.01 NG/ML — SIGNIFICANT CHANGE UP (ref 0–0.01)
WBC # BLD: 5.41 K/UL — SIGNIFICANT CHANGE UP (ref 3.8–10.5)
WBC # FLD AUTO: 5.41 K/UL — SIGNIFICANT CHANGE UP (ref 3.8–10.5)

## 2020-01-25 PROCEDURE — 71275 CT ANGIOGRAPHY CHEST: CPT | Mod: 26

## 2020-01-25 PROCEDURE — 80053 COMPREHEN METABOLIC PANEL: CPT

## 2020-01-25 PROCEDURE — 85610 PROTHROMBIN TIME: CPT

## 2020-01-25 PROCEDURE — 99284 EMERGENCY DEPT VISIT MOD MDM: CPT

## 2020-01-25 PROCEDURE — 99284 EMERGENCY DEPT VISIT MOD MDM: CPT | Mod: 25

## 2020-01-25 PROCEDURE — 36415 COLL VENOUS BLD VENIPUNCTURE: CPT

## 2020-01-25 PROCEDURE — 85730 THROMBOPLASTIN TIME PARTIAL: CPT

## 2020-01-25 PROCEDURE — 71275 CT ANGIOGRAPHY CHEST: CPT

## 2020-01-25 PROCEDURE — 85379 FIBRIN DEGRADATION QUANT: CPT

## 2020-01-25 PROCEDURE — 84484 ASSAY OF TROPONIN QUANT: CPT

## 2020-01-25 PROCEDURE — 85025 COMPLETE CBC W/AUTO DIFF WBC: CPT

## 2020-01-25 NOTE — ED PROVIDER NOTE - PATIENT PORTAL LINK FT
You can access the FollowMyHealth Patient Portal offered by [facility] by registering at the following website: [email or web address]. By joining [facility]’s FollowMyHealth portal, you will also be able to view your health information using other applications (apps) compatible with our system.

## 2020-01-25 NOTE — ED PROVIDER NOTE - CLINICAL SUMMARY MEDICAL DECISION MAKING FREE TEXT BOX
45 yo M PMHx PE diagnosed 4/19 p/w CP and SOB x 1-2 D. Pt well appearing w/ normal EKG. Less suspicion for PE since pt currently on Eliquis. No risk factors for ACS. No FHx of early MI or sudden cardiac death. Benign stress test was performed in the past 2 Y. 45 yo M PMHx PE diagnosed 4/19 p/w CP and SOB x 1-2 D. Pt well appearing w/ normal EKG. Less suspicion for PE since pt currently on Eliquis. No risk factors for ACS. No FHx of early MI or sudden cardiac death. Had neg stress test was performed in the past 2 years per pt.

## 2020-01-25 NOTE — ED PROVIDER NOTE - PHYSICAL EXAMINATION
VITAL SIGNS: I have reviewed nursing notes and confirm.  CONSTITUTIONAL: Well-developed; well-nourished; in no acute distress.   SKIN:  warm and dry, no acute rash.   HEAD:  normocephalic, atraumatic.  EYES: EOM intact; conjunctiva and sclera clear.  ENT: No nasal discharge; airway clear.   NECK: Supple; non tender.  CARD: Non pleuritic CP w/ mild SOB  RESP:  Clear to auscultation b/l, no wheezes, rales or rhonchi.  ABD: Normal bowel sounds; soft; non-distended; non-tender; no guarding/ rebound.  EXT: Normal ROM. No clubbing, cyanosis or edema. 2+ pulses to b/l ue/le.  NEURO: Alert, oriented, grossly unremarkable  PSYCH: Cooperative, mood and affect appropriate.

## 2020-01-25 NOTE — ED PROVIDER NOTE - PROGRESS NOTE DETAILS
CTA  no PE noted no evidence to suggest ACS or aortic dissection - may dc -- to see pmd in 2 days - return prec dw pt including continued CP, or any sob dizziness

## 2020-01-25 NOTE — ED PROVIDER NOTE - NSFOLLOWUPINSTRUCTIONS_ED_ALL_ED_FT
Chest Pain    AMBULATORY CARE:    Chest pain can be caused by a range of conditions, from not serious to life-threatening. It may be caused by a heart attack or a blood clot in your lungs. Sometimes chest pain or pressure is caused by poor blood flow to your heart (angina). Infection, inflammation, or a fracture in the bones or cartilage in your chest can cause pain or discomfort. Chest pain can also be a symptom of a digestive problem, such as acid reflux or a stomach ulcer. An anxiety attack or a strong emotion such as anger can also cause chest pain. It is important to follow up with your healthcare provider to find the cause of your chest pain.    Common symptoms you may have with chest pain:     Fever or sweating       Nausea or vomiting       Shortness of breath       Discomfort or pressure that spreads from your chest to your back, jaw, or arm       A racing or slow heartbeat       Feeling weak, tired, or faint     Call 911 if:     You have any of the following signs of a heart attack:   Squeezing, pressure, or pain in your chest      You may also have any of the following:   Discomfort or pain in your back, neck, jaw, stomach, or arm      Shortness of breath      Nausea or vomiting      Lightheadedness or a sudden cold sweat        Seek care immediately if:     You have chest discomfort that gets worse, even with medicine.      You cough or vomit blood.       Your bowel movements are black or bloody.       You cannot stop vomiting, or it hurts to swallow.     Contact your healthcare provider if:     You have questions or concerns about your condition or care.        Treatment for chest pain may include medicine to treat your symptoms while your healthcare provider finds the cause of your chest pain.     Medicines may be given to treat the cause of your chest pain. Examples include pain medicine, anxiety medicine, or medicines to increase blood flow to your heart.       Do not take certain medicines without asking your healthcare provider first. These include NSAIDs, herbal or vitamin supplements, or hormones (estrogen or progestin).       One or more stents may need to be placed in your heart if pain was caused by blockage. A stent is a wire mesh tube that helps hold your artery open.    Follow up with your healthcare provider within 72 hours, or as directed: You may need to return for more tests to find the cause of your chest pain. You may be referred to a specialist, such as a cardiologist or gastroenterologist. Write down your questions so you remember to ask them during your visits.     Healthy living tips: The following are general healthy guidelines. If your chest pain is caused by a heart problem, your healthcare provider will give you specific guidelines to follow.    Do not smoke. Nicotine and other chemicals in cigarettes and cigars can cause lung and heart damage. Ask your healthcare provider for information if you currently smoke and need help to quit. E-cigarettes or smokeless tobacco still contain nicotine. Talk to your healthcare provider before you use these products.       Eat a variety of healthy, low-fat, low-salt foods. Healthy foods include fruits, vegetables, whole-grain breads, low-fat dairy products, beans, lean meats, and fish. Ask for more information about a heart healthy diet.      Drink plenty of water every day. Your body is made of mostly water. Water helps your body to control your temperature and blood pressure. Ask your healthcare provider how much water you should drink every day.      Ask about activity. Your healthcare provider will tell you which activities to limit or avoid. Ask when you can drive, return to work, and have sex. Ask about the best exercise plan for you.      Maintain a healthy weight. Ask your healthcare provider how much you should weigh. Ask him or her to help you create a weight loss plan if you are overweight.       Get the flu and pneumonia vaccines. All adults should get the influenza (flu) vaccine. Get it every year as soon as it becomes available. The pneumococcal vaccine is given to adults aged 65 years or older. The vaccine is given every 5 years to prevent pneumococcal disease, such as pneumonia.    If you have a stent:     Carry your stent card with you at all times.       Let all healthcare providers know that you have a stent.          © Copyright [employer] 2020       back to top                      © Copyright [employer] 2020

## 2020-01-25 NOTE — ED ADULT NURSE NOTE - OBJECTIVE STATEMENT
Pt to ER w/ report of sob for five days and chest tightness. Hx PE and pt takes eliquis.  Pt reports slight but worsening sob and anxiety.  Pt denies n/v/f/c/abd pain.  No excessive work of breathing noted, pt speaking full word sentences.  12 lead ekg done and shown to ER attending physician. Skin warm and dry. Will continue to monitor.

## 2020-01-25 NOTE — ED PROVIDER NOTE - OBJECTIVE STATEMENT
45 y/o M PMHx testicular cancer treated w/ radiation in remission for 14 Y, and unprovoked PE diagnosed 4/19 on Eliquis presents to the ED with c/o 1-2 D of CP w/ mild SOB at rest. Pt denies cough, fevers, chills, leg swelling, calf tenderness, Hx DM, smoking, and HLD. Pt has no symptoms from his testicular cancer.

## 2020-01-27 ENCOUNTER — TRANSCRIPTION ENCOUNTER (OUTPATIENT)
Age: 45
End: 2020-01-27

## 2020-01-31 ENCOUNTER — APPOINTMENT (OUTPATIENT)
Dept: OTOLARYNGOLOGY | Facility: CLINIC | Age: 45
End: 2020-01-31
Payer: COMMERCIAL

## 2020-01-31 VITALS
OXYGEN SATURATION: 99 % | SYSTOLIC BLOOD PRESSURE: 130 MMHG | DIASTOLIC BLOOD PRESSURE: 89 MMHG | RESPIRATION RATE: 18 BRPM | TEMPERATURE: 98.2 F | HEART RATE: 85 BPM

## 2020-01-31 DIAGNOSIS — R07.89 OTHER CHEST PAIN: ICD-10-CM

## 2020-01-31 DIAGNOSIS — R06.02 SHORTNESS OF BREATH: ICD-10-CM

## 2020-01-31 DIAGNOSIS — Z87.898 PERSONAL HISTORY OF OTHER SPECIFIED CONDITIONS: ICD-10-CM

## 2020-01-31 PROCEDURE — 31575 DIAGNOSTIC LARYNGOSCOPY: CPT

## 2020-01-31 PROCEDURE — 99214 OFFICE O/P EST MOD 30 MIN: CPT | Mod: 25

## 2020-08-27 ENCOUNTER — APPOINTMENT (OUTPATIENT)
Dept: OTOLARYNGOLOGY | Facility: CLINIC | Age: 45
End: 2020-08-27

## 2021-02-22 NOTE — CONSULT LETTER
[Dear  ___] : Dear  [unfilled], [Consult Letter:] : I had the pleasure of evaluating your patient, [unfilled]. [Please see my note below.] : Please see my note below. [Consult Closing:] : Thank you very much for allowing me to participate in the care of this patient.  If you have any questions, please do not hesitate to contact me. [Sincerely,] : Sincerely, [DrAmmon  ___] : Dr. GLASER [Konstantin Varela MD, FACS] : Konstantin Varela MD, FACS [Director] : Director [[facility]] : [facility]  [[facility]] : [facility] [[facility]] : [facility]  [DrAmmon ___] : Dr. GLASER [FreeTextEntry3] : \par Konstantin Varela M.D., FACS, ECNU\par Director [facility]\par [facility]\par Certified in Thyroid/Parathyroid/Neck Ultrasound, ECNU/ AIUM\par \par , Department of Otolaryngology\par [facility] [employer] at [facility]\par

## 2021-02-22 NOTE — PROCEDURE
[Image(s) Captured] : image(s) captured and filed [Unable to Cooperate with Mirror] : patient unable to cooperate with mirror [Gag Reflex] : gag reflex preventing mirror examination [Complicated Symptoms] : complicated symptoms requiring more thorough examination than provided by mirror [Globus] : globus [Topical Lidocaine] : topical lidocaine [Flexible Endoscope] : examined with the flexible endoscope [Serial Number: ___] : Serial Number: [unfilled] [Oxymetazoline HCl] : oxymetazoline HCl [de-identified] : The nasal septum is minimally deviated to the left. There are no masses or polyps and the nasal mucosa and secretions are normal. The choanae and posterior nasopharynx are normal without masses or drainage. The Eustachian tube orifices appear patent. The pharynx, including the posterior and lateral pharyngeal walls, the vallecula and base of tongue are normal without ulcerations, lesions or masses. The hypopharynx including the pyriform sinuses open well without pooling of secretions, mucosal lesions or masses. The supraglottic larynx including the epiglottis, petiole, glossoepiglottic folds and pharyngoepiglottic folds are normal without mucosal lesions, ulcerations or masses. The glottis is slightly rotated and reveals normal false vocal folds. The true vocal folds are glistening white, tense and of equal length, without paralysis, having symmetric mobility on adduction and abduction. There are no mucosal lesions, nodules, cysts, erythroplasia or leukoplakia. The posterior cricoid area has healthy pink mucosa in the interarytenoid area and esophageal inlet. There is slight thickening/edema of the interarytenoid mucosa or erythema suggestive of posterior laryngitis from laryngopharyngeal acid reflux disease. The trachea is pushed posteriorly but without narrowing in the immediate subglottis. His upper airway is patent. The trachea is not significantly compressed to the shandra.  Video recorded.\par  [de-identified] : a goiter with substernal extension and recurrent compressive symptoms involving the right thyroid lobe.

## 2021-02-22 NOTE — DATA REVIEWED
[de-identified] : see history of present illness [de-identified] : see HPI [de-identified] : Pulmonary notes reviewed

## 2021-02-22 NOTE — HISTORY OF PRESENT ILLNESS
[FreeTextEntry1] : Renny returns after he was scheduled to have a completion thyroidectomy for a right sided goiter in 2017 but needed a cardiac stress test and this took longer than expected.  He ultimately deferred the operation because of a busy travel work schedule.  He developed SOB last  April and was found to have multiple pulmonary emboli of uncertain etiology on CT angio. 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  He was ultimately treated with anticoagulants (Elaquis).  His stress test in 2017 was normal.  He also was treated for Lyme disease with oral antibiotics.  He had a full hematologic w/u and no coagulopathy identified.  He is saw a pulmonologist to rule out metastatic disease w/u (for testicular cancer) with CT cross-sectional imaging.  There were no concerning findings and he is being monitored.  He was recently evaluated by Dr. Lou on the Pulmonary service in November and a chest CT was negative for pathology.  A cardiac echo did not show any concerning results.  PFTs were normal on 11/21/19.  Last week he was walking and developed SOB and felt lightheaded.  He was evaluated at the [facility] ED for a PE with CT angio and there was no evidence of a PE. He continues to have intermittent episodes of SOB and light-headedness.  He is still having a globus sensation and describes a pressure sensation in the throat but stable. He is using a portable pulse oximeter and he has occasional O2 desats to 89%.  He has intermittent GERD managed with a PPI but better with dietary measures. He has been on anticoagulation for 9 months. He was supposed to have a V-Q test but after this most recent episode of SOB and concern for tracheal compression, he was advised to consider surgery sooner than later.  He has never been evaluated for sleep apnea but does not have EDS if he gets an adequate duration of sleep. He snores occasionally.   He was referred to evaluate his airway again today for treatment planning.    [de-identified] : Renny is a generally healthy 41-year-old male  who had a left thyroid lobectomy at the age of 15, for a presumed goiter and not malignant. soon after his thyroidectomy he started taking Levothyroxine for unclear reasons but over the years has continued taking it and there has been progressive growth of the right thyroid lobe with increasing symptoms related to pressure with a globus sensation.  He denies true dysphagia but had an upper GI endoscopy for severe GERD 2 years ago and found to have a Schatzki's ring that was dilated and he had another dilation 6 months ago. He denies recent voice changes, significant shortness of breath other than related to deconditioning. He snores but denies excessive daytime sleepiness.  He is taking Ritalin for ADD.  He is able to lie flat without dyspnea.  He denies any pain in the neck but has a sense of pressure discomfort that occurs regularly ans especially when swallowing.  His weight has been stable and he is not sure if there had been more growth of the gland after stopping LT4 in 2015 for ~ 1 year.  His TSH has become more suppressed with the growth and is now near the lower limit of normal with a normal free T4 on last blood testing.   He noted that after his last US of the neck on 03/28/17, he had exacerbation of symptoms with severe pressure sensation and was found to have a right thyroid lobe measuring 9.5 x 2.9 x 5 CM. There is a probable cluster of solid and cystic nodules in the mid to lower pole measuring 3.2 x 2.5 x 2.7 CM as well as an exophytic lower pole nodule extending to the left of midline measuring 7 x 3.3 x 6.8 CM. There has been significant growth since he was last evaluated previously measuring 5.9 x 3.4 for the cluster of nodules in the lower pole and 3.6 x 2.6 CM for the exophytic lower pole nodule. his mother has thyroid nodules that are being followed and his grandmother maternal grandmother had her thyroid removed presumably for goiter but he is not aware of any family history of thyroid cancer. He has no known radiation exposure is ankle up in [facility] not near a nuclear power plant.  However, he was treated for testicular cancer in 2004 with a radical orchiectomy and pelvic radiation.  He has congenital deafness AS.   \par

## 2021-02-23 ENCOUNTER — APPOINTMENT (OUTPATIENT)
Dept: OTOLARYNGOLOGY | Facility: CLINIC | Age: 46
End: 2021-02-23
Payer: COMMERCIAL

## 2021-02-23 ENCOUNTER — NON-APPOINTMENT (OUTPATIENT)
Age: 46
End: 2021-02-23

## 2021-02-23 VITALS
TEMPERATURE: 98.6 F | HEART RATE: 112 BPM | SYSTOLIC BLOOD PRESSURE: 131 MMHG | OXYGEN SATURATION: 95 % | DIASTOLIC BLOOD PRESSURE: 87 MMHG

## 2021-02-23 DIAGNOSIS — R09.89 OTHER SPECIFIED SYMPTOMS AND SIGNS INVOLVING THE CIRCULATORY AND RESPIRATORY SYSTEMS: ICD-10-CM

## 2021-02-23 DIAGNOSIS — J39.8 OTHER SPECIFIED DISEASES OF UPPER RESPIRATORY TRACT: ICD-10-CM

## 2021-02-23 DIAGNOSIS — R06.02 SHORTNESS OF BREATH: ICD-10-CM

## 2021-02-23 PROCEDURE — 99215 OFFICE O/P EST HI 40 MIN: CPT | Mod: 25,57

## 2021-02-23 PROCEDURE — 31575 DIAGNOSTIC LARYNGOSCOPY: CPT

## 2021-02-23 PROCEDURE — 99072 ADDL SUPL MATRL&STAF TM PHE: CPT

## 2021-02-23 RX ORDER — METHYLPHENIDATE HYDROCHLORIDE 18 MG/1
18 TABLET, EXTENDED RELEASE ORAL
Refills: 0 | Status: COMPLETED | COMMUNITY
End: 2021-02-23

## 2021-02-23 RX ORDER — APIXABAN 5 MG/1
5 TABLET, FILM COATED ORAL
Refills: 0 | Status: DISCONTINUED | COMMUNITY
End: 2021-02-23

## 2021-02-23 RX ORDER — FAMOTIDINE 20 MG/1
20 TABLET, FILM COATED ORAL
Qty: 60 | Refills: 0 | Status: ACTIVE | COMMUNITY
Start: 2020-09-15

## 2021-02-23 RX ORDER — BUPROPION HYDROCHLORIDE 75 MG/1
75 TABLET, FILM COATED ORAL
Qty: 60 | Refills: 0 | Status: ACTIVE | COMMUNITY
Start: 2020-09-15

## 2021-02-23 RX ORDER — METHYLPHENIDATE HYDROCHLORIDE 27 MG/1
27 TABLET, EXTENDED RELEASE ORAL
Qty: 90 | Refills: 0 | Status: ACTIVE | COMMUNITY
Start: 2020-11-17

## 2021-02-23 RX ORDER — TOPIRAMATE 50 MG/1
TABLET, COATED ORAL
Refills: 0 | Status: COMPLETED | COMMUNITY
End: 2021-02-23

## 2021-02-23 RX ORDER — DOXYCYCLINE HYCLATE 100 MG/1
100 TABLET ORAL
Refills: 0 | Status: COMPLETED | COMMUNITY
End: 2021-02-23

## 2021-03-01 NOTE — DATA REVIEWED
[de-identified] : see HPI [de-identified] : see history of present illness [de-identified] : see HPI [de-identified] : Pulmonary notes reviewed

## 2021-03-01 NOTE — PROCEDURE
[Image(s) Captured] : image(s) captured and filed [Unable to Cooperate with Mirror] : patient unable to cooperate with mirror [Gag Reflex] : gag reflex preventing mirror examination [Globus] : globus [Topical Lidocaine] : topical lidocaine [Oxymetazoline HCl] : oxymetazoline HCl [Flexible Endoscope] : examined with the flexible endoscope [Serial Number: ___] : Serial Number: [unfilled] [Obstruction] : acute airway obstruction evaluation [de-identified] : The nasal septum is minimally deviated to the left. There are no masses or polyps and the nasal mucosa and secretions are normal. The choanae and posterior nasopharynx are normal without masses or drainage. The Eustachian tube orifices appear patent. The pharynx, including the posterior and lateral pharyngeal walls, the vallecula and base of tongue are normal without ulcerations, lesions or masses. The hypopharynx including the pyriform sinuses open well without pooling of secretions, mucosal lesions or masses. The supraglottic larynx including the epiglottis, petiole, glossoepiglottic folds and pharyngoepiglottic folds are normal without mucosal lesions, ulcerations or masses. The glottis is slightly rotated and reveals normal false vocal folds. The true vocal folds are glistening white, tense and of equal length, without paralysis, having symmetric mobility on adduction and abduction. There are no mucosal lesions, nodules, cysts, erythroplasia or leukoplakia. The posterior cricoid area has healthy pink mucosa in the interarytenoid area and esophageal inlet. There is slight thickening/edema of the interarytenoid mucosa or erythema suggestive of posterior laryngitis from laryngopharyngeal acid reflux disease. The trachea is pushed posteriorly and to the left but without narrowing in the immediate subglottis. His upper airway is patent. [de-identified] : a goiter with substernal extension and recurrent compressive symptoms involving the right thyroid lobe.

## 2021-03-01 NOTE — HISTORY OF PRESENT ILLNESS
[de-identified] : Renny is a generally healthy 41-year-old male  who had a left thyroid lobectomy at the age of 15, for a presumed goiter and not malignant. soon after his thyroidectomy he started taking Levothyroxine for unclear reasons but over the years has continued taking it and there has been progressive growth of the right thyroid lobe with increasing symptoms related to pressure with a globus sensation.  He denies true dysphagia but had an upper GI endoscopy for severe GERD 2 years ago and found to have a Schatzki's ring that was dilated and he had another dilation 6 months ago. He denies recent voice changes, significant shortness of breath other than related to deconditioning. He snores but denies excessive daytime sleepiness.  He is taking Ritalin for ADD.  He is able to lie flat without dyspnea.  He denies any pain in the neck but has a sense of pressure discomfort that occurs regularly ans especially when swallowing.  His weight has been stable and he is not sure if there had been more growth of the gland after stopping LT4 in 2015 for ~ 1 year.  His TSH has become more suppressed with the growth and is now near the lower limit of normal with a normal free T4 on last blood testing.   He noted that after his last US of the neck on 03/28/17, he had exacerbation of symptoms with severe pressure sensation and was found to have a right thyroid lobe measuring 9.5 x 2.9 x 5 CM. There is a probable cluster of solid and cystic nodules in the mid to lower pole measuring 3.2 x 2.5 x 2.7 CM as well as an exophytic lower pole nodule extending to the left of midline measuring 7 x 3.3 x 6.8 CM. There has been significant growth since he was last evaluated previously measuring 5.9 x 3.4 for the cluster of nodules in the lower pole and 3.6 x 2.6 CM for the exophytic lower pole nodule. his mother has thyroid nodules that are being followed and his grandmother maternal grandmother had her thyroid removed presumably for goiter but he is not aware of any family history of thyroid cancer. He has no known radiation exposure is ankle up in [facility] not near a nuclear power plant.  However, he was treated for testicular cancer in 2004 with a radical orchiectomy and pelvic radiation.  He has congenital deafness AS.   \par  [FreeTextEntry1] : Renny returns after he was scheduled to have a completion thyroidectomy for a right sided goiter in 2017 but needed a cardiac stress test and this took longer than expected.  He ultimately deferred the operation because of a busy travel work schedule.  He developed SOB in April 2019 and was found to have multiple pulmonary emboli of uncertain etiology on CT angio. 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  He was ultimately treated with anticoagulants (Eliquis).  His stress test in 2017 was normal.  He also was treated for Lyme disease with oral antibiotics in the past.  He had a full hematologic w/u and no coagulopathy was ever identified.  He is saw a pulmonologist to rule out metastatic disease w/u (for testicular cancer) with CT cross-sectional imaging.  There were no concerning findings and he is being monitored.   by Dr. Lou on the Pulmonary service.  A f/u chest CT was negative for pathology and PFTs were normal in November 2019.  A cardiac echo did not show any concerning results. on 11/21/19. Last January he developed SOB and was evaluated in the [facility] ED for a PE with CT angio and there was no evidence of a PE.   He is still having an intermittent globus sensation and describes a pressure sensation in the throat.  He has intermittent GERD managed with Pepcid AC and better with dietary measures. He had been on anticoagulation for 9 months and is now only on ASA 81 mg daily and followed by his Hematologist, Dr. Chacko.  He has never been evaluated for sleep apnea but does not have EDS if he gets an adequate duration of sleep. He snores occasionally.  He returns today to consider moving forward with completion thyroidectomy and excision of a substernal goiter. His last CT angio in January again demonstrated a large substernal goiter. He has not had any imaging for over a year and has not followed up with his Pulmonologist Dr. Lou.

## 2021-03-01 NOTE — REASON FOR VISIT
[FreeTextEntry2] : a surgical consultation regarding substernal growth of the right thyroid lobe after prior partial thyroidectomy, substernal extension and compressive symptoms.  [FreeTextEntry1] : Referred by Diego Neri MD Endocrinologist,  PCP Brayden Hess MD

## 2021-03-01 NOTE — CONSULT LETTER
[Dear  ___] : Dear  [unfilled], [Consult Letter:] : I had the pleasure of evaluating your patient, [unfilled]. [Please see my note below.] : Please see my note below. [Consult Closing:] : Thank you very much for allowing me to participate in the care of this patient.  If you have any questions, please do not hesitate to contact me. [Sincerely,] : Sincerely, [DrAmmon  ___] : Dr. GLASER [DrAmmon ___] : Dr. GLASER [Konstantin Varela MD, FACS] : Konstantin Varela MD, FACS [Director] : Director [[facility]] : [facility]  [[facility]] : [facility] [[facility]] : [facility]  [FreeTextEntry3] : \par Konstantin Varela M.D., FACS, ECNU\par Director [facility]\par [facility]\par Certified in Thyroid/Parathyroid/Neck Ultrasound, ECNU/ AIUM\par \par , Department of Otolaryngology\par [facility] [employer] at [facility]\par

## 2021-03-02 ENCOUNTER — RESULT REVIEW (OUTPATIENT)
Age: 46
End: 2021-03-02

## 2021-03-02 ENCOUNTER — OUTPATIENT (OUTPATIENT)
Dept: OUTPATIENT SERVICES | Facility: HOSPITAL | Age: 46
LOS: 1 days | End: 2021-03-02

## 2021-03-02 ENCOUNTER — APPOINTMENT (OUTPATIENT)
Dept: CT IMAGING | Facility: CLINIC | Age: 46
End: 2021-03-02
Payer: COMMERCIAL

## 2021-03-02 PROCEDURE — 71250 CT THORAX DX C-: CPT | Mod: 26

## 2021-03-02 PROCEDURE — 70490 CT SOFT TISSUE NECK W/O DYE: CPT | Mod: 26

## 2021-03-03 ENCOUNTER — APPOINTMENT (OUTPATIENT)
Dept: THORACIC SURGERY | Facility: CLINIC | Age: 46
End: 2021-03-03
Payer: COMMERCIAL

## 2021-03-03 VITALS
TEMPERATURE: 97.1 F | RESPIRATION RATE: 18 BRPM | HEIGHT: 72 IN | BODY MASS INDEX: 26.01 KG/M2 | HEART RATE: 88 BPM | WEIGHT: 192 LBS | OXYGEN SATURATION: 96 % | DIASTOLIC BLOOD PRESSURE: 73 MMHG | SYSTOLIC BLOOD PRESSURE: 138 MMHG

## 2021-03-03 PROCEDURE — 99203 OFFICE O/P NEW LOW 30 MIN: CPT

## 2021-03-03 PROCEDURE — 99072 ADDL SUPL MATRL&STAF TM PHE: CPT

## 2021-03-06 ENCOUNTER — APPOINTMENT (OUTPATIENT)
Dept: DISASTER EMERGENCY | Facility: CLINIC | Age: 46
End: 2021-03-06

## 2021-03-07 LAB — SARS-COV-2 N GENE NPH QL NAA+PROBE: NOT DETECTED

## 2021-03-08 ENCOUNTER — APPOINTMENT (OUTPATIENT)
Dept: OTOLARYNGOLOGY | Facility: CLINIC | Age: 46
End: 2021-03-08

## 2021-03-09 ENCOUNTER — APPOINTMENT (OUTPATIENT)
Dept: PULMONOLOGY | Facility: CLINIC | Age: 46
End: 2021-03-09
Payer: COMMERCIAL

## 2021-03-09 ENCOUNTER — NON-APPOINTMENT (OUTPATIENT)
Age: 46
End: 2021-03-09

## 2021-03-09 ENCOUNTER — APPOINTMENT (OUTPATIENT)
Dept: SLEEP CENTER | Facility: HOME HEALTH | Age: 46
End: 2021-03-09

## 2021-03-09 ENCOUNTER — APPOINTMENT (OUTPATIENT)
Dept: OTOLARYNGOLOGY | Facility: CLINIC | Age: 46
End: 2021-03-09

## 2021-03-09 VITALS
WEIGHT: 191 LBS | SYSTOLIC BLOOD PRESSURE: 136 MMHG | BODY MASS INDEX: 25.87 KG/M2 | OXYGEN SATURATION: 98 % | HEART RATE: 70 BPM | DIASTOLIC BLOOD PRESSURE: 78 MMHG | HEIGHT: 72 IN | RESPIRATION RATE: 12 BRPM | TEMPERATURE: 98.1 F

## 2021-03-09 DIAGNOSIS — R06.02 SHORTNESS OF BREATH: ICD-10-CM

## 2021-03-09 DIAGNOSIS — Z01.818 ENCOUNTER FOR OTHER PREPROCEDURAL EXAMINATION: ICD-10-CM

## 2021-03-09 DIAGNOSIS — I26.99 OTHER PULMONARY EMBOLISM W/OUT ACUTE COR PULMONALE: ICD-10-CM

## 2021-03-09 DIAGNOSIS — J98.4 OTHER DISORDERS OF LUNG: ICD-10-CM

## 2021-03-09 DIAGNOSIS — R91.8 OTHER NONSPECIFIC ABNORMAL FINDING OF LUNG FIELD: ICD-10-CM

## 2021-03-09 LAB — SARS-COV-2 N GENE NPH QL NAA+PROBE: NOT DETECTED

## 2021-03-09 PROCEDURE — ZZZZZ: CPT

## 2021-03-09 PROCEDURE — 94727 GAS DIL/WSHOT DETER LNG VOL: CPT

## 2021-03-09 PROCEDURE — 94729 DIFFUSING CAPACITY: CPT

## 2021-03-09 PROCEDURE — 99215 OFFICE O/P EST HI 40 MIN: CPT | Mod: 25

## 2021-03-09 PROCEDURE — 99072 ADDL SUPL MATRL&STAF TM PHE: CPT

## 2021-03-09 PROCEDURE — 94060 EVALUATION OF WHEEZING: CPT

## 2021-03-10 NOTE — DISCUSSION/SUMMARY
[FreeTextEntry1] : ATTENDING SUMMARY \par 3-9-21:\par -no pallor or icterus \par -no cervical adenopathy, right lobe of the thyroid is palpable and moves with deglutition \par -no JVD at 45 degrees, no hepatojugular reflux \par -mildly diminished air entry right base, no wheezing, rhonchi or adventitious sounds \par -normal S1, S2, no murmurs, rubs, gallops, P2 not loud or split \par -no clinically detected hepatosplenomegaly \par -no cyanosis, no clubbing, no articular manifestations, no skin thickening, good pulses \par \par \par I have evaluated his CT performed 3/2/21.   There is a large goiter in the right lobe of the thyroid.   The caliber of the trachea not significantly affected.   THere is minimal mass effect with minimal displacement of the trachea to the contralateral side.    evidence of scarring in the lingula left lower lobe, unchanged since 2019.

## 2021-03-10 NOTE — REASON FOR VISIT
[Follow-Up] : a follow-up visit [Pulmonary Embolism] : pulmonary embolism [TextBox_44] : pre-op clearance

## 2021-03-10 NOTE — HISTORY OF PRESENT ILLNESS
[TextBox_4] : Pt is a 44 yo M with PMH ADD, Lyme disease, multinodular goiter, pneumonia with pleurisy (2017), testicular cancer (2004), Schatsky's ring/gastritis (1998). Diagnosed with PE in April 2019. \par \par Initial visit 6/20/19: Woke up one morning at end of march and was short of breath. Throughout the next month was more short of breath for the next month. \par He had multiple ECGs and CXRs that were fine. Occasional CP when he was exerting himself. \par Then had acute CP, went to urgent care first, eventually went to ED ([facility]) and was diagnosed with PE in the ED on 4/28/19. \par Prior to diagnosis, was traveling to [place] every week, and also had trips to [facility] and [facility] in the months leading up to his diagnosis. \par He did have some lower extremity joint pain, but no pain or swelling of the calves.\par Denies testosterone supplementation.\par No previous blood clots.\par No FHx blood clots.\par Follows with Dr. Chacko in hematology, who is overseeing his anticoagulation. He is on Eliquis 5 mg BID. \par Dr. Varela is planning for thyroidectomy for multinodular goiter. Surgical date not yet set but pt will require clearance. \par \par 3-9-21:\par referred by Dr. Varela for pre-op clearance for completion thyroidectomy on Friday\par he is not short of breath\par He takes baby aspirin daily

## 2021-03-10 NOTE — ASSESSMENT
[FreeTextEntry1] : 3-9-21:\par It was a pleasure to meet Renny in consultation today. His respiratory issues are summarized:\par \par 1. Pulmonary emboli\par Renny woke up with new shortness of breath in March 2019 that continued for one month. He then had acute right-sided chest pain and went to urgent care, then [facility] ED. He was diagnosed with B/L pulmonary emboli on a CTA chest on 4/28/19.  He was on anticoagulation (Eliquis 5 mg BID), stopped around summer of 2020. He was on it for 12 months. He follows with Dr. Chacko in hematology. He is currently taking baby aspirin daily.\par \par Echo 10/9/19 shows a normal PASP, 24 mmHg. The right ventricle is mildly dilated. Normal RV systolic function. \par \par Labs 5/29/19: he tested negative for prothrombin G mutation, negative for Factor V Leiden mutation, elevated antithrombin activity, normal antithrombin Antigen, normal protein S activity, normal Protein C activity, normal RONIT-1, normal d-dimer\par Labs 6/20/19 show normal homocysteine level\par \par Plan:\par - We will see if Dr. Chacko checked antiphospholipid Ab, anticardiolipin Ab. If not, we will order to complete hypercoaguable work up\par \par 2. Pre-op thyroid surgery\par Renny is scheduled for thyroid surgery on Friday with Dr. Varela. PFT and 6MWT 11/21/19 shows normal spirometry, normal lung volumes and normal diffusion capacity. On 6MWT he walked 732 meters without any desaturation. PFT today shows 3/9/21 shows normal spirometry, normal lung volumes and normal diffusion capacity. Based on the ARISCAT Score for Postoperative Pulmonary Complications, Renny is Low risk (1.6% risk) of in-hospital post-op pulmonary complications (composite including respiratory failure, respiratory infection, pleural effusion, atelectasis, pneumothorax, bronchospasm, aspiration pneumonitis. We can clear Renny for thyroid surgery from a pulmonary perspective. He had a history of pulmonary embolism, which was probably a provoked pulmonary embolism because he was taking long distance air travel on a weekly basis. There is no prior history of PE or family history of PE and the hypercoagulable work up has been negative thus far. The incidence of intra post op thromboembolic event is not significant higher than the average population. However, we will take all possible precautions including:\par \par Patient is cleared from the pulmonary perspective with the following recommendations:\par - Intra- and post-operative SpO2 monitoring\par - DVT prophylaxis per surgical team wth subcutaneous anticoagulation therapy\par - Incentive spirometry night before the surgery to continue for several days after surgery\par - If evidence of upper airway obstruction, start patient on CPAP\par \par 3. Opacities on CTA\par Renny's CTA from 4/28/19 shows that there is subpleural linear (two branches) opacities with spiculation noted in the LLL adjacent to the diaphragm. I believe this is likely scar tissue from the pneumonia with pleurisy diagnosed in 2017. There was also a subpleural opacity at the RLL base 9.7 mm x 5.3 mm, mean 7.5 mm on the prior CT scan, which is not visible now.  It was probably an infectious or inflammatory process. We reviewed chest CT done 3/02/2021 which shows mildly dilated airways. He has remnants of pleurisy on the lingula and left lower lobe.\par \par 4. RML lung cyst\par There is a 2.7 cm right middle lobe cyst seen on his CTA from 4/28/19. Chest CT done 3/02/2021 shows an air-filled cyst in the right middle lobe, which is unchanged. We will continue to follow this annually.\par \par 5. Health maintenance\par Received pneumococcal vaccination on 4/30/19 as per pt.\par \par Return to clinic: 6 months

## 2021-03-10 NOTE — PROCEDURE
[FreeTextEntry1] : PFT 3/9/21\par FVC: 6.89 L (124%)--> 6.90 L (124%)  \par FEV1: 5.78 L (133%)--> 5.83 L (134%)  \par FEV1/FVC: 84%--> 84%\par [id number]%: 6.73 L/s (173%)--> 6.85 L/s (176%)\par T.85 L (117%)\par RV/T%\par DLCO: 30.21 (93%)\par Normal spirometry.\par \par EXAM: CT CHEST\par PROCEDURE DATE: 2021\par INTERPRETATION: CT of the CHEST without intravenous contrast dated 3/2/2021 11:35 AM\par INDICATION: J39.8\par TECHNIQUE: CT of the chest was performed without intravenous contrast. Intravenous contrast was not used Axial and coronal and sagittal images were produced and reviewed. Additional supine axial imaging in expiration was performed. Additional prone axial imaging in inspiration was performed.\par PRIOR STUDIES: CT angiogram chest 2020. CT chest 2019.\par FINDINGS: The heart is normal in size. No pericardial effusion is seen. Evaluation of the vasculature is limited without intravenous contrast, but the great vessels are grossly unremarkable. Evaluation for adenopathy is limited without intravenous contrast. Within that limitation, no mediastinal or hilar or axillary lymphadenopathy is seen. There is a multinodular goiter present predominantly involving the right lobe and isthmus with retrosternal extension into the anterior mediastinum. Probably status post left subtotal thyroidectomy. Calcifications seen involving the mediastinal aspect of the goiter. The right lobe measurement is 5.7 x 4.1 x 5.7 cm. The substernal goiter component measures 6.5 x 3.9 x 4.2 cm. There is slight indentation on the right side of trachea at the level of T2. No significant tracheal compression. No esophageal compression or displacement. Normal caliber of main pulmonary artery.\par No pleural effusions are seen. Evaluation of the pulmonary parenchyma demonstrates a couple of bilateral calcified lung granulomata. Subsegmental atelectasis inferior lingula and anterior basal segment left lower lobe-unchanged. 2.9 cm air-filled cyst in the right middle lobe-unchanged. On the expiratory imaging appears to be air trapping in the bilateral lower lobes. No endobronchial lesion identified. No evidence of interstitial lung disease. No evidence of emphysema.\par Limited evaluation of the upper abdomen demonstrates 2 mm nonobstructing calculus right kidney. 0.8 cm hypodensity in segment eight, too small to characterize-but unchanged.. 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 No obstruction. Normal lung volumes. Normal diffusion capacity. Normal walk distance with no desaturation. \par \par EXAM: CT CHEST PROCEDURE DATE: 2019 \par FINDINGS: CT of the chest was performed without the administration of intravenous contrast. Reconstructions were performed in the sagittal and coronal planes. Comparison is made to prior study dated 2019. \par Evaluation of the chest demonstrates marked nodular heterogeneous enlargement of the thyroid gland with multiple calcified nodules, unchanged since 2019. Negative for thoracic, axillary, mediastinal or hilar \par adenopathy. There is no pleural or pericardial effusion. Evaluation of the lung parenchyma demonstrates no pulmonary consolidation or mass. No discrete pulmonary nodule. No endobronchial lesion. No suspicious pulmonary finding. Stable subpleural bleb within the right middle lobe. Interval resolution of \par previously demonstrated subpleural opacity within the right lower lobe. No discrete nodule within the right middle lobe. No endobronchial lesion. \par Evaluation of the unenhanced upper abdomen demonstrates a probable tiny cyst within the right lobe of the liver, unchanged since 2019. Arterial vascular calcification. Trace hiatal hernia. Evaluation of the osseous structures are unremarkable. \par IMPRESSION: No suspicious pulmonary finding. \par \par EXAM: ECHOCARDIOGRAM (CARDIOL) PROCEDURE DATE: 10/09/2019 \par 1. There is trace tricuspid regurgitation. Pulmonary artery systolic pressure (estimated using the tricuspid regurgitant gradient and an estimate of right atrial pressure) is 24.3 mmHg. \par  2. The right ventricle is mildly dilated. Normal RV systolic function. \par  3. The left ventricle is normal in size, wall thickness, and systolic function with a calculated ejection fraction of 61.0 %. \par  4. There is a trivial sized pericardial effusion without echocardiographic evidence of cardiac tamponade.

## 2021-03-10 NOTE — PHYSICAL EXAM
[No Acute Distress] : no acute distress [Normal Oropharynx] : normal oropharynx [Normal Appearance] : normal appearance [No Neck Mass] : no neck mass [Normal Rate/Rhythm] : normal rate/rhythm [Normal S1, S2] : normal s1, s2 [No Murmurs] : no murmurs [No Resp Distress] : no resp distress [No Abnormalities] : no abnormalities [Benign] : benign [Normal Gait] : normal gait [No Clubbing] : no clubbing [No Cyanosis] : no cyanosis [No Edema] : no edema [FROM] : FROM [Normal Color/ Pigmentation] : normal color/ pigmentation [No Focal Deficits] : no focal deficits [Oriented x3] : oriented x3 [Normal Affect] : normal affect [TextBox_11] : no pallor or icterus [TextBox_44] : no cervical adenopathy, right lobe of the thyroid is palpable and moves with deglutition [TextBox_68] : mildly diminished air entry right base, no wheezing, rhonchi or adventitious sounds [TextBox_105] : no articular manifestations, no skin thickening, good pulses

## 2021-03-11 ENCOUNTER — TRANSCRIPTION ENCOUNTER (OUTPATIENT)
Age: 46
End: 2021-03-11

## 2021-03-11 VITALS
DIASTOLIC BLOOD PRESSURE: 80 MMHG | RESPIRATION RATE: 16 BRPM | HEIGHT: 72 IN | WEIGHT: 195.11 LBS | OXYGEN SATURATION: 98 % | HEART RATE: 74 BPM | SYSTOLIC BLOOD PRESSURE: 123 MMHG | TEMPERATURE: 98 F

## 2021-03-11 DIAGNOSIS — E04.2 NONTOXIC MULTINODULAR GOITER: ICD-10-CM

## 2021-03-11 RX ORDER — ACETAMINOPHEN AND CODEINE 300; 30 MG/1; MG/1
300-30 TABLET ORAL
Qty: 30 | Refills: 0 | Status: ACTIVE | COMMUNITY
Start: 2021-03-11 | End: 1900-01-01

## 2021-03-11 NOTE — ASU PATIENT PROFILE, ADULT - PMH
Acid reflux    ADD (attention deficit disorder)    Bipolar disorder    HTN (hypertension)    Lower back pain    Nontoxic goiter    PE (pulmonary thromboembolism)    Testicular cancer

## 2021-03-11 NOTE — ASU PATIENT PROFILE, ADULT - PSH
S/P orchiectomy  lap  S/P partial thyroidectomy  left, 1989  S/P tonsillectomy    S/P tonsillectomy and adenoidectomy

## 2021-03-12 ENCOUNTER — APPOINTMENT (OUTPATIENT)
Dept: THORACIC SURGERY | Facility: HOSPITAL | Age: 46
End: 2021-03-12

## 2021-03-12 ENCOUNTER — APPOINTMENT (OUTPATIENT)
Dept: OTOLARYNGOLOGY | Facility: HOSPITAL | Age: 46
End: 2021-03-12
Payer: COMMERCIAL

## 2021-03-12 ENCOUNTER — RESULT REVIEW (OUTPATIENT)
Age: 46
End: 2021-03-12

## 2021-03-12 ENCOUNTER — TRANSCRIPTION ENCOUNTER (OUTPATIENT)
Age: 46
End: 2021-03-12

## 2021-03-12 ENCOUNTER — INPATIENT (INPATIENT)
Facility: HOSPITAL | Age: 46
LOS: 2 days | Discharge: HOME HEALTH | DRG: 627 | End: 2021-03-15
Attending: SPECIALIST | Admitting: SPECIALIST
Payer: COMMERCIAL

## 2021-03-12 DIAGNOSIS — K21.9 GASTRO-ESOPHAGEAL REFLUX DISEASE WITHOUT ESOPHAGITIS: ICD-10-CM

## 2021-03-12 DIAGNOSIS — F98.8 OTHER SPECIFIED BEHAVIORAL AND EMOTIONAL DISORDERS WITH ONSET USUALLY OCCURRING IN CHILDHOOD AND ADOLESCENCE: ICD-10-CM

## 2021-03-12 DIAGNOSIS — Z90.79 ACQUIRED ABSENCE OF OTHER GENITAL ORGAN(S): Chronic | ICD-10-CM

## 2021-03-12 DIAGNOSIS — E89.0 POSTPROCEDURAL HYPOTHYROIDISM: Chronic | ICD-10-CM

## 2021-03-12 DIAGNOSIS — Z92.3 PERSONAL HISTORY OF IRRADIATION: ICD-10-CM

## 2021-03-12 DIAGNOSIS — Z86.711 PERSONAL HISTORY OF PULMONARY EMBOLISM: ICD-10-CM

## 2021-03-12 DIAGNOSIS — K29.60 OTHER GASTRITIS WITHOUT BLEEDING: ICD-10-CM

## 2021-03-12 DIAGNOSIS — F31.9 BIPOLAR DISORDER, UNSPECIFIED: ICD-10-CM

## 2021-03-12 DIAGNOSIS — E04.2 NONTOXIC MULTINODULAR GOITER: ICD-10-CM

## 2021-03-12 DIAGNOSIS — Z85.47 PERSONAL HISTORY OF MALIGNANT NEOPLASM OF TESTIS: ICD-10-CM

## 2021-03-12 DIAGNOSIS — I10 ESSENTIAL (PRIMARY) HYPERTENSION: ICD-10-CM

## 2021-03-12 DIAGNOSIS — E04.9 NONTOXIC GOITER, UNSPECIFIED: ICD-10-CM

## 2021-03-12 DIAGNOSIS — Z90.79 ACQUIRED ABSENCE OF OTHER GENITAL ORGAN(S): ICD-10-CM

## 2021-03-12 DIAGNOSIS — Z79.01 LONG TERM (CURRENT) USE OF ANTICOAGULANTS: ICD-10-CM

## 2021-03-12 DIAGNOSIS — Z90.89 ACQUIRED ABSENCE OF OTHER ORGANS: Chronic | ICD-10-CM

## 2021-03-12 LAB
ALBUMIN SERPL ELPH-MCNC: 3.8 G/DL — SIGNIFICANT CHANGE UP (ref 3.3–5)
ALP SERPL-CCNC: 60 U/L — SIGNIFICANT CHANGE UP (ref 40–120)
ALT FLD-CCNC: 21 U/L — SIGNIFICANT CHANGE UP (ref 10–45)
ANION GAP SERPL CALC-SCNC: 12 MMOL/L — SIGNIFICANT CHANGE UP (ref 5–17)
APTT BLD: 23.8 SEC — LOW (ref 27.5–35.5)
AST SERPL-CCNC: 16 U/L — SIGNIFICANT CHANGE UP (ref 10–40)
BASOPHILS # BLD AUTO: 0.02 K/UL — SIGNIFICANT CHANGE UP (ref 0–0.2)
BASOPHILS NFR BLD AUTO: 0.1 % — SIGNIFICANT CHANGE UP (ref 0–2)
BILIRUB SERPL-MCNC: 0.3 MG/DL — SIGNIFICANT CHANGE UP (ref 0.2–1.2)
BUN SERPL-MCNC: 15 MG/DL — SIGNIFICANT CHANGE UP (ref 7–23)
CALCIUM SERPL-MCNC: 8.6 MG/DL — SIGNIFICANT CHANGE UP (ref 8.4–10.5)
CHLORIDE SERPL-SCNC: 104 MMOL/L — SIGNIFICANT CHANGE UP (ref 96–108)
CO2 SERPL-SCNC: 23 MMOL/L — SIGNIFICANT CHANGE UP (ref 22–31)
CREAT SERPL-MCNC: 1.13 MG/DL — SIGNIFICANT CHANGE UP (ref 0.5–1.3)
EOSINOPHIL # BLD AUTO: 0 K/UL — SIGNIFICANT CHANGE UP (ref 0–0.5)
EOSINOPHIL NFR BLD AUTO: 0 % — SIGNIFICANT CHANGE UP (ref 0–6)
GAS PNL BLDA: SIGNIFICANT CHANGE UP
GLUCOSE SERPL-MCNC: 155 MG/DL — HIGH (ref 70–99)
HCT VFR BLD CALC: 37.7 % — LOW (ref 39–50)
HGB BLD-MCNC: 12.7 G/DL — LOW (ref 13–17)
IMM GRANULOCYTES NFR BLD AUTO: 0.5 % — SIGNIFICANT CHANGE UP (ref 0–1.5)
INR BLD: 1.05 — SIGNIFICANT CHANGE UP (ref 0.88–1.16)
LYMPHOCYTES # BLD AUTO: 0.74 K/UL — LOW (ref 1–3.3)
LYMPHOCYTES # BLD AUTO: 5.1 % — LOW (ref 13–44)
MAGNESIUM SERPL-MCNC: 1.6 MG/DL — SIGNIFICANT CHANGE UP (ref 1.6–2.6)
MCHC RBC-ENTMCNC: 28.5 PG — SIGNIFICANT CHANGE UP (ref 27–34)
MCHC RBC-ENTMCNC: 33.7 GM/DL — SIGNIFICANT CHANGE UP (ref 32–36)
MCV RBC AUTO: 84.7 FL — SIGNIFICANT CHANGE UP (ref 80–100)
MONOCYTES # BLD AUTO: 0.36 K/UL — SIGNIFICANT CHANGE UP (ref 0–0.9)
MONOCYTES NFR BLD AUTO: 2.5 % — SIGNIFICANT CHANGE UP (ref 2–14)
NEUTROPHILS # BLD AUTO: 13.37 K/UL — HIGH (ref 1.8–7.4)
NEUTROPHILS NFR BLD AUTO: 91.8 % — HIGH (ref 43–77)
NRBC # BLD: 0 /100 WBCS — SIGNIFICANT CHANGE UP (ref 0–0)
PHOSPHATE SERPL-MCNC: 4.2 MG/DL — SIGNIFICANT CHANGE UP (ref 2.5–4.5)
PLATELET # BLD AUTO: 183 K/UL — SIGNIFICANT CHANGE UP (ref 150–400)
POTASSIUM SERPL-MCNC: 3.8 MMOL/L — SIGNIFICANT CHANGE UP (ref 3.5–5.3)
POTASSIUM SERPL-SCNC: 3.8 MMOL/L — SIGNIFICANT CHANGE UP (ref 3.5–5.3)
PROT SERPL-MCNC: 6.4 G/DL — SIGNIFICANT CHANGE UP (ref 6–8.3)
PROTHROM AB SERPL-ACNC: 12.6 SEC — SIGNIFICANT CHANGE UP (ref 10.6–13.6)
PTH-INTACT IO % DIF SERPL: 75.2 PG/ML — HIGH (ref 8.5–72.5)
RBC # BLD: 4.45 M/UL — SIGNIFICANT CHANGE UP (ref 4.2–5.8)
RBC # FLD: 12.4 % — SIGNIFICANT CHANGE UP (ref 10.3–14.5)
SODIUM SERPL-SCNC: 139 MMOL/L — SIGNIFICANT CHANGE UP (ref 135–145)
WBC # BLD: 14.57 K/UL — HIGH (ref 3.8–10.5)
WBC # FLD AUTO: 14.57 K/UL — HIGH (ref 3.8–10.5)

## 2021-03-12 PROCEDURE — 95865 NEEDLE EMG LARYNX: CPT | Mod: 26

## 2021-03-12 PROCEDURE — 60270 REMOVAL OF THYROID: CPT | Mod: 59

## 2021-03-12 PROCEDURE — 60260 REPEAT THYROID SURGERY: CPT

## 2021-03-12 PROCEDURE — 60270 REMOVAL OF THYROID: CPT

## 2021-03-12 RX ORDER — CEFAZOLIN SODIUM 1 G
2000 VIAL (EA) INJECTION ONCE
Refills: 0 | Status: DISCONTINUED | OUTPATIENT
Start: 2021-03-12 | End: 2021-03-12

## 2021-03-12 RX ORDER — CEFAZOLIN SODIUM 1 G
2000 VIAL (EA) INJECTION EVERY 6 HOURS
Refills: 0 | Status: DISCONTINUED | OUTPATIENT
Start: 2021-03-12 | End: 2021-03-12

## 2021-03-12 RX ORDER — ACETAMINOPHEN 500 MG
1000 TABLET ORAL ONCE
Refills: 0 | Status: COMPLETED | OUTPATIENT
Start: 2021-03-12 | End: 2021-03-12

## 2021-03-12 RX ORDER — ONDANSETRON 8 MG/1
4 TABLET, FILM COATED ORAL EVERY 6 HOURS
Refills: 0 | Status: DISCONTINUED | OUTPATIENT
Start: 2021-03-12 | End: 2021-03-15

## 2021-03-12 RX ORDER — CEFAZOLIN SODIUM 1 G
2000 VIAL (EA) INJECTION EVERY 8 HOURS
Refills: 0 | Status: COMPLETED | OUTPATIENT
Start: 2021-03-12 | End: 2021-03-14

## 2021-03-12 RX ORDER — PANTOPRAZOLE SODIUM 20 MG/1
40 TABLET, DELAYED RELEASE ORAL ONCE
Refills: 0 | Status: COMPLETED | OUTPATIENT
Start: 2021-03-12 | End: 2021-03-12

## 2021-03-12 RX ORDER — MAGNESIUM SULFATE 500 MG/ML
2 VIAL (ML) INJECTION ONCE
Refills: 0 | Status: COMPLETED | OUTPATIENT
Start: 2021-03-12 | End: 2021-03-12

## 2021-03-12 RX ORDER — HEPARIN SODIUM 5000 [USP'U]/ML
5000 INJECTION INTRAVENOUS; SUBCUTANEOUS ONCE
Refills: 0 | Status: COMPLETED | OUTPATIENT
Start: 2021-03-12 | End: 2021-03-12

## 2021-03-12 RX ADMIN — Medication 50 GRAM(S): at 21:02

## 2021-03-12 RX ADMIN — PANTOPRAZOLE SODIUM 40 MILLIGRAM(S): 20 TABLET, DELAYED RELEASE ORAL at 21:02

## 2021-03-12 RX ADMIN — Medication 1000 MILLIGRAM(S): at 22:15

## 2021-03-12 RX ADMIN — Medication 100 MILLIGRAM(S): at 22:07

## 2021-03-12 RX ADMIN — Medication 400 MILLIGRAM(S): at 21:39

## 2021-03-12 RX ADMIN — HEPARIN SODIUM 5000 UNIT(S): 5000 INJECTION INTRAVENOUS; SUBCUTANEOUS at 22:31

## 2021-03-12 NOTE — PROGRESS NOTE ADULT - SUBJECTIVE AND OBJECTIVE BOX
POST-OPERATIVE NOTE    Procedure: Right Thyroidectomy     Diagnosis/Indication: Goiter     Surgeon: Konstantin Gonzalez     Subjective: Patient is not in acute distress, resting comfortably in bed, alert and oriented x 3. Patient denies fever, chills, nausea, vomiting, chest pain, calf pain, shortness of breathe, headache, dizziness, paresthesia or muscle weakness       Objective:  T(C): 36.6 (03-13-21 @ 05:02), Max: 36.6 (03-13-21 @ 05:02)  T(F): 97.9 (03-13-21 @ 05:02), Max: 97.9 (03-13-21 @ 05:02)  HR: 72 (03-13-21 @ 05:00) (72 - 80)  BP: --  RR: 13 (03-13-21 @ 05:00) (11 - 23)  SpO2: 100% (03-13-21 @ 05:00) (99% - 100%)  Wt(kg): --                        13.1   11.62 )-----------( 190      ( 13 Mar 2021 03:33 )             39.0     03-13    139  |  103  |  16  ----------------------------<  146<H>  4.2   |  25  |  1.12    Ca    8.7      13 Mar 2021 03:33  Phos  5.0     03-13  Mg     2.1     03-13    TPro  6.3  /  Alb  3.9  /  TBili  0.3  /  DBili  x   /  AST  16  /  ALT  20  /  AlkPhos  53  03-13      Gen: NAD, resting comfortably in bed  C/V: Regular rate  NECK: ALEXANDRA drain with SS output, no swelling, no ecchymosis noted, dressing clean dry and intact   Pulm: Nonlabored breathing, no respiratory distress  Abd: Soft, NT, ND, incisions clean, dry without drainage and intact  Extrem: WWP, no calf edema or tenderness, SCDs in place      A/P: 45y Male s/p Right Thyroidectomy   Pain/Nausea control   IVF: No Fluids   DVT ppx: SCDs in place   Encourage OOBA/IS  AM Labs

## 2021-03-12 NOTE — BRIEF OPERATIVE NOTE - OPERATION/FINDINGS
Goiter extending substernal requiring hemisternotomy Goiter extending substernal requiring nidia-sternotomy

## 2021-03-12 NOTE — H&P ADULT - NSICDXPASTMEDICALHX_GEN_ALL_CORE_FT
PAST MEDICAL HISTORY:  Acid reflux     ADD (attention deficit disorder)     Bipolar disorder     HTN (hypertension)     Lower back pain     Nontoxic goiter     PE (pulmonary thromboembolism)     Testicular cancer

## 2021-03-12 NOTE — H&P ADULT - NSICDXPASTSURGICALHX_GEN_ALL_CORE_FT
Detail Level: Zone
Action 3: Continue
Other Instructions: Fluocinonide cream. Apply twice a day x 2 weeks to affected areas
PAST SURGICAL HISTORY:  S/P orchiectomy lap    S/P partial thyroidectomy left, 1989    S/P tonsillectomy     S/P tonsillectomy and adenoidectomy

## 2021-03-12 NOTE — HISTORY OF PRESENT ILLNESS
[FreeTextEntry1] : 46 y/o nonsmoker with a PMH of ADD, Lyme disease, multinodular goiter s/p left thyroid lobectomy at the age of 15 for nonmalignant goiter, pneumonia, testicular cancer (2004), Scatsky's ring/gastritis (1998), and PE in 2019. Since his progressive growth of the right thyroid lobe he has had a globus sensation. He was scheduled to have a completion thyroidectomy for a right sided goiter in 2017, however was delayed. 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 He presents today for surgical evaluation of the substernal goiter. He is being referred by Dr. Konstantin Varela. \par \par PFT done on 11/21/19 showed FEV1 = 5.41, 121% of predicted value, FVC = 6.58, 120% of predicted value, PEF = 7.50, 76% of predicted value and DLCO = 31.9, 107% of predicted value. \par \par CTA chest completed on 03/02/21:\par -no significant interval change in the multinodular goiter with retro/substernal extension \par

## 2021-03-12 NOTE — BRIEF OPERATIVE NOTE - OPERATION/FINDINGS
Transverse cervical incision with dissection to R thyroid gland using blunt dissection and electrocautery. Ligation of inferior and superior pole vessels using harmonic device, bipolar, and 3-0 Silk suture. Complete right thyroid resection (pt has hx of L thyroidectomy at age 15). Both superior and inferior right parathyroid glands identified and viable. Intraoperative monitoring of bilateral recurrent and vagus nerves with good wave form. Thoracic surgery team completed partial sternotomy and resection of substernal goiter (please see Thoracic surgery operative note for further details).  Hemostasis achieved and confirmed with multiple Valsalva maneuvers and extensive irrigation and washout. One 14 F ALEXANDRA drain placed below level of strap muscles. Sternal incision closed with sternal wires, Vicryl and prolene suture. Transverse incision closed with Vicryl and running Prolene suture.

## 2021-03-12 NOTE — H&P ADULT - HISTORY OF PRESENT ILLNESS
41 M PMH testicular cancer s/p radiation (2004), hx of presumed goiter s/p L thyroidectomy (14yo), Schatzki's gastritis and severe GERD, HTN,  hx of PE (dx 2019) on Eliquisx9 months, stopped in Summer of 2020, presenting with increasingly symptomatic multinodular goiter extending substernally, presenting today for partial R thyroidectomy and resection of substernal goiter. He reports feeling well today without complaints    PMH: as above and ADD  PSH: as above, and tonsillectomy and adenoidectomy  Allergies: NKDA   41 M PMH testicular cancer s/p radical orcheictomy and pelvic radiation (2004), hx of presumed goiter s/p L thyroidectomy (16yo), Schatzki's gastritis and severe GERD, HTN,  hx of provoked PE (dx 2019) on Eliquisx9 months, stopped in Summer of 2020, presenting with increasingly symptomatic multinodular goiter extending substernally, presenting today for completion R thyroidectomy and resection of substernal goiter. He reports feeling well today without complaints.    PMH: as above and ADD, follows with Dr. Bucio  and Dr. Kitchen for PE management, now only on Aspirin 81 mg daily, congenital deafness AS,  PSH: as above, and tonsillectomy and adenoidectomy  Allergies: NKDA  Social: Denies smoking, denies etoh use, denies illicit drug use

## 2021-03-12 NOTE — ASSESSMENT
[FreeTextEntry1] : 44 y/o nonsmoker with a PMH of ADD, Lyme disease, multinodular goiter s/p left thyroid lobectomy at the age of 15 for nonmalignant goiter, pneumonia, testicular cancer (2004), Scatsky's ring/gastritis (1998), and PE in 2019. Since his progressive growth of the right thyroid lobe he has had a globus sensation. He was scheduled to have a completion thyroidectomy for a right sided goiter in 2017, however was delayed. 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 He presents today for surgical evaluation of the substernal goiter. He is being referred by Dr. Konstantin Varela. \par \par CT imaging was reviewed in detail with the patient. I explained that I do think it would be difficult from a cervical approach based on his prior surgery with scar tissue and concern for the nerve preservation. I explained that the approach would be possible sternal split vs. potentially using the robot to free the chest. \par \par I will discuss with Dr. Tinsley to determine sternal split vs. robotic approach.\par \par Plan:\par 1. Possible sternal split vs. robotic substernal resection 03/12/21\par \par I, TRE ORTIZ , am scribing for and in the presence of [Dr. Hayden Amezcua] the following sections: History of present illness, past Medical/family/surgical/family/social history, review of systems, vital signs, physical exam and disposition.\par

## 2021-03-12 NOTE — BRIEF OPERATIVE NOTE - NSICDXBRIEFPREOP_GEN_ALL_CORE_FT
PRE-OP DIAGNOSIS:  Multinodular goiter 12-Mar-2021 19:47:44  Ghazala Espinoza  
PRE-OP DIAGNOSIS:  Goiter 12-Mar-2021 17:29:41  Tabitha Gonzalez

## 2021-03-12 NOTE — BRIEF OPERATIVE NOTE - NSICDXBRIEFPROCEDURE_GEN_ALL_CORE_FT
PROCEDURES:  Surgical removal of thyroid goiter 12-Mar-2021 17:29:09 with mini sternotomy Tabitha Gonzalez  
PROCEDURES:  Total thyroidectomy 12-Mar-2021 19:47:28  Ghazala Espinoza

## 2021-03-12 NOTE — PROGRESS NOTE ADULT - SUBJECTIVE AND OBJECTIVE BOX
CTICU  CRITICAL  CARE  attending     Hand off received 					   Pertinent clinical, laboratory, radiographic, hemodynamic, echocardiographic, respiratory data, microbiologic data and chart were reviewed and analyzed frequently throughout the course of the day and night    45 years old  Male with ADD, HTN, history of  testicular cancer s/p radical orcheictomy and pelvic radiation (2004), hx of  goiter s/p L thyroidectomy (16yo), Schatzki's gastritis and severe GERD.  He has history of Pulmonary Embolism (dx 2019) on Eliquisx9 months, stopped in Summer of 2020.  Recently he feels like globus of food in the esophagus.  He  presented with increasingly symptomatic multinodular goiter extending substernally.  S/P resection of substernal goiter.         FAMILY HISTORY:  PAST MEDICAL & SURGICAL HISTORY:  Acid reflux  Lower back pain  Nontoxic goiter  Bipolar disorder  ADD (attention deficit disorder)  HTN (hypertension)  Testicular cancer  PE (pulmonary thromboembolism)  S/P partial thyroidectomy  left, 1989  S/P tonsillectomy and adenoidectomy  S/P tonsillectomy  S/P orchiectomy  lap          14 system review was unremarkable    Vital signs, hemodynamic and respiratory parameters were reviewed from the bedside nursing flow sheet.  ICU Vital Signs Last 24 Hrs  T(C): 36.2 (12 Mar 2021 21:02), Max: 36.3 (12 Mar 2021 19:05)  T(F): 97.1 (12 Mar 2021 21:02), Max: 97.4 (12 Mar 2021 19:05)  HR: 88 (12 Mar 2021 22:30) (87 - 98)  BP: --  BP(mean): --  ABP: 139/74 (12 Mar 2021 22:30) (126/70 - 140/77)  ABP(mean): 99 (12 Mar 2021 22:30) (92 - 102)  RR: 12 (12 Mar 2021 22:30) (8 - 16)  SpO2: 100% (12 Mar 2021 22:30) (99% - 100%)    Adult Advanced Hemodynamics Last 24 Hrs  CVP(mm Hg): --  CVP(cm H2O): --  CO: --  CI: --  PA: --  PA(mean): --  PCWP: --  SVR: --  SVRI: --  PVR: --  PVRI: --, ABG - ( 12 Mar 2021 19:19 )  pH, Arterial: 7.39  pH, Blood: x     /  pCO2: 41    /  pO2: 141   / HCO3: 24    / Base Excess: -0.7  /  SaO2: 99                  Intake and output was reviewed and the fluid balance was calculated  Daily     Daily   I&O's Summary    12 Mar 2021 07:01  -  12 Mar 2021 22:42  --------------------------------------------------------  IN: 150 mL / OUT: 90 mL / NET: 60 mL        All lines and drain sites were assessed    Neuro: Follows commands. Moves all 4 extremities.  Neck: No JVD.  CVS: S1, S2, No S3.  Lungs: Good air entry bilaterally.  Abd: Soft. No tenderness. + Bowel sounds.  Vascular: + DP/PT.  Extremities: No edema.  Lymphatic: Normal.  Skin: No abnormalities.      labs  CBC Full  -  ( 12 Mar 2021 19:15 )  WBC Count : 14.57 K/uL  RBC Count : 4.45 M/uL  Hemoglobin : 12.7 g/dL  Hematocrit : 37.7 %  Platelet Count - Automated : 183 K/uL  Mean Cell Volume : 84.7 fl  Mean Cell Hemoglobin : 28.5 pg  Mean Cell Hemoglobin Concentration : 33.7 gm/dL  Auto Neutrophil # : 13.37 K/uL  Auto Lymphocyte # : 0.74 K/uL  Auto Monocyte # : 0.36 K/uL  Auto Eosinophil # : 0.00 K/uL  Auto Basophil # : 0.02 K/uL  Auto Neutrophil % : 91.8 %  Auto Lymphocyte % : 5.1 %  Auto Monocyte % : 2.5 %  Auto Eosinophil % : 0.0 %  Auto Basophil % : 0.1 %    03-12    139  |  104  |  15  ----------------------------<  155<H>  3.8   |  23  |  1.13    Ca    8.6      12 Mar 2021 19:15  Phos  4.2     03-12  Mg     1.6     03-12    TPro  6.4  /  Alb  3.8  /  TBili  0.3  /  DBili  x   /  AST  16  /  ALT  21  /  AlkPhos  60  03-12    PT/INR - ( 12 Mar 2021 19:15 )   PT: 12.6 sec;   INR: 1.05          PTT - ( 12 Mar 2021 19:15 )  PTT:23.8 sec  The current medications were reviewed   MEDICATIONS  (STANDING):  ceFAZolin   IVPB 2000 milliGRAM(s) IV Intermittent every 8 hours    MEDICATIONS  (PRN):  ondansetron Injectable 4 milliGRAM(s) IV Push every 6 hours PRN Nausea and/or Vomiting            45y old  Male with retrosternal goiter.  S/P resection of substernal goiter, R thyroidectomy   Hemodynamically stable.  Good oxygenation.  Fair urine out put.  Overall doing well.      My plan includes :  Monitor parathyroid hormone levels  Statin and Betablocker.  Close hemodynamic, ventilatory and drain monitoring and management  Monitor for arrhythmias and monitor parameters for organ perfusion  Monitor neurologic status  Monitor renal function.  Head of the bed should remain elevated to 45 deg .   Chest PT and IS will be encouraged  Monitor adequacy of oxygenation and ventilation and attempt to wean oxygen  Nutritional goals will be met using po eventually , ensure adequate caloric intake and monitor the same  Stress ulcer and VTE prophylaxis will be achieved    Glycemic control is satisfactory  Electrolytes have been repleted as necessary and wound care has been carried out. Pain control has been achieved.   Aggressive physical therapy and early mobility and ambulation goals will be met   The family was updated about the course and plan  CRITICAL CARE TIME SPENT in evaluation and management, reassessments, review and interpretation of labs and x-rays, ventilator and hemodynamic management, formulating a plan and coordinating care: ___90____ MIN.  Time does not include procedural time.  CTICU ATTENDING     					    Sunil Parra MD

## 2021-03-12 NOTE — H&P ADULT - ASSESSMENT
41 M PMH testicular cancer s/p radiation (2004), hx of presumed goiter s/p L thyroidectomy (16yo), Schatzki's gastritis and severe GERD, HTN,  hx of PE (dx 2019) on Eliquisx9 months, stopped in Summer of 2020, presenting with increasingly symptomatic multinodular goiter extending substernally, presenting today for partial R thyroidectomy and resection of substernal goiter, now s/p complete thyroidectomy with partial sternotomy and resection of goiter.    Admit to CTICU overnight  Monitor Del drain output  Regular diet, IVF  Monitor AM IOPTH, Calcium, Albumin, monitor H/H  HSQ/SCDs for DVT ppx  Encourage OOB/Ambulation  Rest of care per CTICU  Plan discussed with Dr. Varela     41 M PMH testicular cancer s/p radiation (2004), hx of presumed goiter s/p L thyroidectomy (14yo), Schatzki's gastritis and severe GERD, HTN,  hx of PE (dx 2019) on Eliquisx9 months, stopped in Summer of 2020, presenting with increasingly symptomatic multinodular goiter extending substernally, presenting today for partial R thyroidectomy and resection of substernal goiter, now s/p complete thyroidectomy with partial sternotomy and resection of goiter.    Admit to CTICU overnight  Monitor Del drain output  Regular diet, IVF  Monitor AM IOPTH, Calcium, Albumin, monitor H/H  HSQ/SCDs for DVT ppx  Monitor I+Os, trial of void by 9pm  Encourage OOB/Ambulation  Rest of care per CTICU  Plan discussed with Dr. Varela

## 2021-03-12 NOTE — BRIEF OPERATIVE NOTE - NSICDXBRIEFPOSTOP_GEN_ALL_CORE_FT
POST-OP DIAGNOSIS:  Multinodular goiter 12-Mar-2021 19:47:54  Ghazala Espinoza  
POST-OP DIAGNOSIS:  Goiter 12-Mar-2021 17:30:10  Tabitha Gonzalez

## 2021-03-13 LAB
ALBUMIN SERPL ELPH-MCNC: 3.9 G/DL — SIGNIFICANT CHANGE UP (ref 3.3–5)
ALP SERPL-CCNC: 53 U/L — SIGNIFICANT CHANGE UP (ref 40–120)
ALT FLD-CCNC: 20 U/L — SIGNIFICANT CHANGE UP (ref 10–45)
ANION GAP SERPL CALC-SCNC: 11 MMOL/L — SIGNIFICANT CHANGE UP (ref 5–17)
APTT BLD: 26.7 SEC — LOW (ref 27.5–35.5)
AST SERPL-CCNC: 16 U/L — SIGNIFICANT CHANGE UP (ref 10–40)
BILIRUB SERPL-MCNC: 0.3 MG/DL — SIGNIFICANT CHANGE UP (ref 0.2–1.2)
BUN SERPL-MCNC: 16 MG/DL — SIGNIFICANT CHANGE UP (ref 7–23)
CALCIUM SERPL-MCNC: 8.7 MG/DL — SIGNIFICANT CHANGE UP (ref 8.4–10.5)
CHLORIDE SERPL-SCNC: 103 MMOL/L — SIGNIFICANT CHANGE UP (ref 96–108)
CO2 SERPL-SCNC: 25 MMOL/L — SIGNIFICANT CHANGE UP (ref 22–31)
CREAT SERPL-MCNC: 1.12 MG/DL — SIGNIFICANT CHANGE UP (ref 0.5–1.3)
GAS PNL BLDA: SIGNIFICANT CHANGE UP
GLUCOSE SERPL-MCNC: 146 MG/DL — HIGH (ref 70–99)
HCT VFR BLD CALC: 39 % — SIGNIFICANT CHANGE UP (ref 39–50)
HGB BLD-MCNC: 13.1 G/DL — SIGNIFICANT CHANGE UP (ref 13–17)
INR BLD: 1.06 — SIGNIFICANT CHANGE UP (ref 0.88–1.16)
MAGNESIUM SERPL-MCNC: 2.1 MG/DL — SIGNIFICANT CHANGE UP (ref 1.6–2.6)
MCHC RBC-ENTMCNC: 28.5 PG — SIGNIFICANT CHANGE UP (ref 27–34)
MCHC RBC-ENTMCNC: 33.6 GM/DL — SIGNIFICANT CHANGE UP (ref 32–36)
MCV RBC AUTO: 85 FL — SIGNIFICANT CHANGE UP (ref 80–100)
NRBC # BLD: 0 /100 WBCS — SIGNIFICANT CHANGE UP (ref 0–0)
PHOSPHATE SERPL-MCNC: 5 MG/DL — HIGH (ref 2.5–4.5)
PLATELET # BLD AUTO: 190 K/UL — SIGNIFICANT CHANGE UP (ref 150–400)
POTASSIUM SERPL-MCNC: 4.2 MMOL/L — SIGNIFICANT CHANGE UP (ref 3.5–5.3)
POTASSIUM SERPL-SCNC: 4.2 MMOL/L — SIGNIFICANT CHANGE UP (ref 3.5–5.3)
PROT SERPL-MCNC: 6.3 G/DL — SIGNIFICANT CHANGE UP (ref 6–8.3)
PROTHROM AB SERPL-ACNC: 12.7 SEC — SIGNIFICANT CHANGE UP (ref 10.6–13.6)
PTH-INTACT IO % DIF SERPL: 83.4 PG/ML — HIGH (ref 8.5–72.5)
RBC # BLD: 4.59 M/UL — SIGNIFICANT CHANGE UP (ref 4.2–5.8)
RBC # FLD: 12.4 % — SIGNIFICANT CHANGE UP (ref 10.3–14.5)
SODIUM SERPL-SCNC: 139 MMOL/L — SIGNIFICANT CHANGE UP (ref 135–145)
WBC # BLD: 11.62 K/UL — HIGH (ref 3.8–10.5)
WBC # FLD AUTO: 11.62 K/UL — HIGH (ref 3.8–10.5)

## 2021-03-13 PROCEDURE — 99233 SBSQ HOSP IP/OBS HIGH 50: CPT

## 2021-03-13 PROCEDURE — 71045 X-RAY EXAM CHEST 1 VIEW: CPT | Mod: 26

## 2021-03-13 RX ORDER — OXYCODONE AND ACETAMINOPHEN 5; 325 MG/1; MG/1
1 TABLET ORAL EVERY 4 HOURS
Refills: 0 | Status: DISCONTINUED | OUTPATIENT
Start: 2021-03-13 | End: 2021-03-15

## 2021-03-13 RX ORDER — BUPROPION HYDROCHLORIDE 150 MG/1
75 TABLET, EXTENDED RELEASE ORAL
Refills: 0 | Status: DISCONTINUED | OUTPATIENT
Start: 2021-03-13 | End: 2021-03-15

## 2021-03-13 RX ORDER — SODIUM CHLORIDE 9 MG/ML
3 INJECTION INTRAMUSCULAR; INTRAVENOUS; SUBCUTANEOUS EVERY 8 HOURS
Refills: 0 | Status: DISCONTINUED | OUTPATIENT
Start: 2021-03-13 | End: 2021-03-15

## 2021-03-13 RX ORDER — HYDROMORPHONE HYDROCHLORIDE 2 MG/ML
0.5 INJECTION INTRAMUSCULAR; INTRAVENOUS; SUBCUTANEOUS ONCE
Refills: 0 | Status: DISCONTINUED | OUTPATIENT
Start: 2021-03-13 | End: 2021-03-13

## 2021-03-13 RX ORDER — HEPARIN SODIUM 5000 [USP'U]/ML
5000 INJECTION INTRAVENOUS; SUBCUTANEOUS EVERY 8 HOURS
Refills: 0 | Status: DISCONTINUED | OUTPATIENT
Start: 2021-03-13 | End: 2021-03-15

## 2021-03-13 RX ORDER — LAMOTRIGINE 25 MG/1
100 TABLET, ORALLY DISINTEGRATING ORAL DAILY
Refills: 0 | Status: DISCONTINUED | OUTPATIENT
Start: 2021-03-13 | End: 2021-03-15

## 2021-03-13 RX ORDER — LISINOPRIL 2.5 MG/1
10 TABLET ORAL DAILY
Refills: 0 | Status: DISCONTINUED | OUTPATIENT
Start: 2021-03-13 | End: 2021-03-15

## 2021-03-13 RX ORDER — ACETAMINOPHEN 500 MG
1000 TABLET ORAL ONCE
Refills: 0 | Status: COMPLETED | OUTPATIENT
Start: 2021-03-13 | End: 2021-03-13

## 2021-03-13 RX ORDER — FAMOTIDINE 10 MG/ML
20 INJECTION INTRAVENOUS DAILY
Refills: 0 | Status: DISCONTINUED | OUTPATIENT
Start: 2021-03-13 | End: 2021-03-15

## 2021-03-13 RX ADMIN — Medication 400 MILLIGRAM(S): at 18:35

## 2021-03-13 RX ADMIN — Medication 100 MILLIGRAM(S): at 21:17

## 2021-03-13 RX ADMIN — SODIUM CHLORIDE 3 MILLILITER(S): 9 INJECTION INTRAMUSCULAR; INTRAVENOUS; SUBCUTANEOUS at 21:17

## 2021-03-13 RX ADMIN — Medication 100 MILLIGRAM(S): at 06:17

## 2021-03-13 RX ADMIN — Medication 1000 MILLIGRAM(S): at 00:00

## 2021-03-13 RX ADMIN — BUPROPION HYDROCHLORIDE 75 MILLIGRAM(S): 150 TABLET, EXTENDED RELEASE ORAL at 18:34

## 2021-03-13 RX ADMIN — HEPARIN SODIUM 5000 UNIT(S): 5000 INJECTION INTRAVENOUS; SUBCUTANEOUS at 14:39

## 2021-03-13 RX ADMIN — SODIUM CHLORIDE 3 MILLILITER(S): 9 INJECTION INTRAMUSCULAR; INTRAVENOUS; SUBCUTANEOUS at 14:17

## 2021-03-13 RX ADMIN — Medication 400 MILLIGRAM(S): at 09:22

## 2021-03-13 RX ADMIN — HYDROMORPHONE HYDROCHLORIDE 0.5 MILLIGRAM(S): 2 INJECTION INTRAMUSCULAR; INTRAVENOUS; SUBCUTANEOUS at 01:00

## 2021-03-13 RX ADMIN — Medication 1000 MILLIGRAM(S): at 10:36

## 2021-03-13 RX ADMIN — HEPARIN SODIUM 5000 UNIT(S): 5000 INJECTION INTRAVENOUS; SUBCUTANEOUS at 21:17

## 2021-03-13 RX ADMIN — HYDROMORPHONE HYDROCHLORIDE 0.5 MILLIGRAM(S): 2 INJECTION INTRAMUSCULAR; INTRAVENOUS; SUBCUTANEOUS at 05:27

## 2021-03-13 RX ADMIN — HYDROMORPHONE HYDROCHLORIDE 0.5 MILLIGRAM(S): 2 INJECTION INTRAMUSCULAR; INTRAVENOUS; SUBCUTANEOUS at 00:30

## 2021-03-13 RX ADMIN — FAMOTIDINE 20 MILLIGRAM(S): 10 INJECTION INTRAVENOUS at 11:16

## 2021-03-13 RX ADMIN — Medication 100 MILLIGRAM(S): at 14:39

## 2021-03-13 RX ADMIN — HYDROMORPHONE HYDROCHLORIDE 0.5 MILLIGRAM(S): 2 INJECTION INTRAMUSCULAR; INTRAVENOUS; SUBCUTANEOUS at 04:34

## 2021-03-13 RX ADMIN — LISINOPRIL 10 MILLIGRAM(S): 2.5 TABLET ORAL at 11:16

## 2021-03-13 RX ADMIN — LAMOTRIGINE 100 MILLIGRAM(S): 25 TABLET, ORALLY DISINTEGRATING ORAL at 11:16

## 2021-03-13 NOTE — PROGRESS NOTE ADULT - SUBJECTIVE AND OBJECTIVE BOX
HX: POD  s/p      SUBJECTIVE: Patient seen and examined bedside by chief resident.    ceFAZolin   IVPB 2000 milliGRAM(s) IV Intermittent every 8 hours    MEDICATIONS  (PRN):  ondansetron Injectable 4 milliGRAM(s) IV Push every 6 hours PRN Nausea and/or Vomiting      I&O's Detail    12 Mar 2021 07:01  -  13 Mar 2021 07:00  --------------------------------------------------------  IN:    IV PiggyBack: 250 mL  Total IN: 250 mL    OUT:    Bulb (mL): 180 mL    Voided (mL): 780 mL  Total OUT: 960 mL    Total NET: -710 mL          Vital Signs Last 24 Hrs  T(C): 36.6 (13 Mar 2021 05:02), Max: 36.6 (13 Mar 2021 01:02)  T(F): 97.9 (13 Mar 2021 05:02), Max: 97.9 (13 Mar 2021 01:02)  HR: 72 (13 Mar 2021 07:00) (65 - 98)  BP: --  BP(mean): --  RR: 21 (13 Mar 2021 07:00) (8 - 23)  SpO2: 100% (13 Mar 2021 07:00) (99% - 100%)    General: NAD, resting comfortably in bed  C/V: pulses present in b/l upper extremities   Pulm: Nonlabored breathing, no respiratory distress  Abd: soft, ND, NT, no rebound or guarding,   Extrem: WWP, no edema, SCDs in place    LABS:                        13.1   11.62 )-----------( 190      ( 13 Mar 2021 03:33 )             39.0     03-13    139  |  103  |  16  ----------------------------<  146<H>  4.2   |  25  |  1.12    Ca    8.7      13 Mar 2021 03:33  Phos  5.0     03-13  Mg     2.1     03-13    TPro  6.3  /  Alb  3.9  /  TBili  0.3  /  DBili  x   /  AST  16  /  ALT  20  /  AlkPhos  53  03-13    PT/INR - ( 13 Mar 2021 03:33 )   PT: 12.7 sec;   INR: 1.06          PTT - ( 13 Mar 2021 03:33 )  PTT:26.7 sec      RADIOLOGY & ADDITIONAL STUDIES:        Plan:   Diet:   IVF:   Abx:   Pain/Nausea:   Home medications:   AM labs       HX: POD1 s/p completion R thyroidectomy and resection of substernal goiter     SUBJECTIVE: Patient seen and examined bedside by chief resident. Endorses feeling a bit of tightness from the dressing but denies difficulty with speaking or breathing. Endorses pain at the sternum. Denies CP, HA, dizziness, calf pain. Denies N/V.     ceFAZolin   IVPB 2000 milliGRAM(s) IV Intermittent every 8 hours    MEDICATIONS  (PRN):  ondansetron Injectable 4 milliGRAM(s) IV Push every 6 hours PRN Nausea and/or Vomiting      I&O's Detail    12 Mar 2021 07:01  -  13 Mar 2021 07:00  --------------------------------------------------------  IN:    IV PiggyBack: 250 mL  Total IN: 250 mL    OUT:    Bulb (mL): 180 mL    Voided (mL): 780 mL  Total OUT: 960 mL    Total NET: -710 mL          Vital Signs Last 24 Hrs  T(C): 36.6 (13 Mar 2021 05:02), Max: 36.6 (13 Mar 2021 01:02)  T(F): 97.9 (13 Mar 2021 05:02), Max: 97.9 (13 Mar 2021 01:02)  HR: 72 (13 Mar 2021 07:00) (65 - 98)  RR: 21 (13 Mar 2021 07:00) (8 - 23)  SpO2: 100% (13 Mar 2021 07:00) (99% - 100%)    General: NAD, resting comfortably in chair  HEENT: clean dry gauze dressings, TTP over sternum at inferior border of wound, ALEXANDRA SS   C/V: pulses present in b/l upper extremities   Pulm: Nonlabored breathing, no respiratory distress  Abd: soft, ND, NT, no rebound or guarding   Extrem: WWP, no edema    LABS:                        13.1   11.62 )-----------( 190      ( 13 Mar 2021 03:33 )             39.0     03-13    139  |  103  |  16  ----------------------------<  146<H>  4.2   |  25  |  1.12    Ca    8.7      13 Mar 2021 03:33  Phos  5.0     03-13  Mg     2.1     03-13    TPro  6.3  /  Alb  3.9  /  TBili  0.3  /  DBili  x   /  AST  16  /  ALT  20  /  AlkPhos  53  03-13    PT/INR - ( 13 Mar 2021 03:33 )   PT: 12.7 sec;   INR: 1.06          PTT - ( 13 Mar 2021 03:33 )  PTT:26.7 sec      RADIOLOGY & ADDITIONAL STUDIES:      A/P:  45y M PMH testicular cancer s/p radical orcheictomy and pelvic radiation (2004), hx of presumed goiter s/p L thyroidectomy (14yo), Schatzki's gastritis and severe GERD, HTN,  hx of provoked PE (dx 2019) on Eliquisx9 months, stopped in Summer of 2020, presenting with increasingly symptomatic multinodular goiter extending substernally, now s/p completion R thyroidectomy and resection of substernal goiter 3/12.     Continue with ALEXANDRA, f/u output  Adv. to CLD and ADAT after that   Start VTE ppx with SQH vs. lovenox per primary team    Please obtain duplex U/S b/l lower ext before discharge  AM labs with PTH and discharge tomorrow possibly      Plan discussed with attending and chief resident.   ____________________________________________________  Latrice Tiwari MD     PGY1 - Surgery Team 4

## 2021-03-13 NOTE — PROGRESS NOTE ADULT - SUBJECTIVE AND OBJECTIVE BOX
CTICU  CRITICAL  CARE  attending     Hand off received 					   Pertinent clinical, laboratory, radiographic, hemodynamic, echocardiographic, respiratory data, microbiologic data and chart were reviewed and analyzed frequently throughout the course of the day and night  Patient seen and examined with CTS/ SH attending at bedside  Pt is a 45y , Male, HEALTH ISSUES - PROBLEM Dx:      , FAMILY HISTORY:  PAST MEDICAL & SURGICAL HISTORY:  Acid reflux    Lower back pain    Nontoxic goiter    Bipolar disorder    ADD (attention deficit disorder)    HTN (hypertension)    Testicular cancer    PE (pulmonary thromboembolism)    S/P partial thyroidectomy  left, 1989    S/P tonsillectomy and adenoidectomy    S/P tonsillectomy    S/P orchiectomy  lap      Patient is a 45y old  Male who presents with a chief complaint of Elective resection of substernal goiter, R thyroidectomy (13 Mar 2021 07:44)      14 system review was unremarkable    Vital signs, hemodynamic and respiratory parameters were reviewed from the bedside nursing flowsheet.  ICU Vital Signs Last 24 Hrs  T(C): 36.7 (13 Mar 2021 18:10), Max: 36.7 (13 Mar 2021 18:10)  T(F): 98 (13 Mar 2021 18:10), Max: 98 (13 Mar 2021 18:10)  HR: 76 (13 Mar 2021 17:51) (64 - 96)  BP: 139/81 (13 Mar 2021 17:51) (138/86 - 140/87)  BP(mean): 103 (13 Mar 2021 17:51) (103 - 108)  ABP: 158/152 (13 Mar 2021 10:07) (132/72 - 168/78)  ABP(mean): 156 (13 Mar 2021 10:07) (95 - 156)  RR: 18 (13 Mar 2021 17:51) (11 - 23)  SpO2: 100% (13 Mar 2021 17:51) (99% - 100%)    Adult Advanced Hemodynamics Last 24 Hrs  CVP(mm Hg): --  CVP(cm H2O): --  CO: --  CI: --  PA: --  PA(mean): --  PCWP: --  SVR: --  SVRI: --  PVR: --  PVRI: --, ABG - ( 13 Mar 2021 03:32 )  pH, Arterial: 7.40  pH, Blood: x     /  pCO2: 39    /  pO2: 151   / HCO3: 24    / Base Excess: -0.8  /  SaO2: 99                  Intake and output was reviewed and the fluid balance was calculated  Daily     Daily   I&O's Summary    12 Mar 2021 07:01  -  13 Mar 2021 07:00  --------------------------------------------------------  IN: 250 mL / OUT: 960 mL / NET: -710 mL    13 Mar 2021 06:01  -  13 Mar 2021 20:07  --------------------------------------------------------  IN: 100 mL / OUT: 435 mL / NET: -335 mL        All lines and drain sites were assessed  Glycemic trend was reviewedCAPILLARY BLOOD GLUCOSE        No acute change in mental status  Auscultation of the chest reveals equal bs  Abdomen is soft  Extremities are warm and well perfused  Wounds appear clean and unremarkable  Antibiotics are periop    labs  CBC Full  -  ( 13 Mar 2021 03:33 )  WBC Count : 11.62 K/uL  RBC Count : 4.59 M/uL  Hemoglobin : 13.1 g/dL  Hematocrit : 39.0 %  Platelet Count - Automated : 190 K/uL  Mean Cell Volume : 85.0 fl  Mean Cell Hemoglobin : 28.5 pg  Mean Cell Hemoglobin Concentration : 33.6 gm/dL  Auto Neutrophil # : x  Auto Lymphocyte # : x  Auto Monocyte # : x  Auto Eosinophil # : x  Auto Basophil # : x  Auto Neutrophil % : x  Auto Lymphocyte % : x  Auto Monocyte % : x  Auto Eosinophil % : x  Auto Basophil % : x    03-13    139  |  103  |  16  ----------------------------<  146<H>  4.2   |  25  |  1.12    Ca    8.7      13 Mar 2021 03:33  Phos  5.0     03-13  Mg     2.1     03-13    TPro  6.3  /  Alb  3.9  /  TBili  0.3  /  DBili  x   /  AST  16  /  ALT  20  /  AlkPhos  53  03-13    PT/INR - ( 13 Mar 2021 03:33 )   PT: 12.7 sec;   INR: 1.06          PTT - ( 13 Mar 2021 03:33 )  PTT:26.7 sec  The current medications were reviewed   MEDICATIONS  (STANDING):  buPROPion . 75 milliGRAM(s) Oral two times a day  ceFAZolin   IVPB 2000 milliGRAM(s) IV Intermittent every 8 hours  famotidine    Tablet 20 milliGRAM(s) Oral daily  heparin   Injectable 5000 Unit(s) SubCutaneous every 8 hours  lamoTRIgine 100 milliGRAM(s) Oral daily  lisinopril 10 milliGRAM(s) Oral daily  sodium chloride 0.9% lock flush 3 milliLiter(s) IV Push every 8 hours    MEDICATIONS  (PRN):  ondansetron Injectable 4 milliGRAM(s) IV Push every 6 hours PRN Nausea and/or Vomiting  oxycodone    5 mG/acetaminophen 325 mG 1 Tablet(s) Oral every 4 hours PRN Severe Pain (7 - 10)       PROBLEM LIST/ ASSESSMENT:  HEALTH ISSUES - PROBLEM Dx:      ,   Patient is a 45y old  Male who presents with a chief complaint of Elective resection of substernal goiter, R thyroidectomy (13 Mar 2021 07:44)     s/p cardiac surgery                My plan includes :  close hemodynamic, ventilatory and drain monitoring and management per post op routine    Monitor for arrhythmias and monitor parameters for organ perfusion  beta blockade not administered due to hemodynamic instability and bradycardia  monitor neurologic status  Head of the bed should remain elevated to 45 deg .   chest PT and IS will be encouraged  monitor adequacy of oxygenation and ventilation and attempt to wean oxygen  antibiotic regimen will be tailored to the clinical, laboratory and microbiologic data  Nutritional goals will be met using po eventually , ensure adequate caloric intake and montior the same  Stress ulcer and VTE prophylaxis will be achieved    Glycemic control is satisfactory  Electrolytes have been repleted as necessary and wound care has been carried out. Pain control has been achieved.   agressive physical therapy and early mobility and ambulation goals will be met   The family was updated about the course and plan  CRITICAL CARE TIME personally provided by me  in evaluation and management, reassessments, review and interpretation of labs and x-rays, ventilator and hemodynamic management, formulating a plan and coordinating care: ___90____ MIN.  Time does not include procedural time. Time spent was non routine post-operarive caRE and included multiple and repeated evaluations at the bedside  CTICU ATTENDING     					    Brendan Boss MD

## 2021-03-14 ENCOUNTER — TRANSCRIPTION ENCOUNTER (OUTPATIENT)
Age: 46
End: 2021-03-14

## 2021-03-14 VITALS — TEMPERATURE: 100 F

## 2021-03-14 LAB
ANION GAP SERPL CALC-SCNC: 7 MMOL/L — SIGNIFICANT CHANGE UP (ref 5–17)
APTT BLD: 25.3 SEC — LOW (ref 27.5–35.5)
BUN SERPL-MCNC: 16 MG/DL — SIGNIFICANT CHANGE UP (ref 7–23)
CALCIUM SERPL-MCNC: 8.7 MG/DL — SIGNIFICANT CHANGE UP (ref 8.4–10.5)
CHLORIDE SERPL-SCNC: 105 MMOL/L — SIGNIFICANT CHANGE UP (ref 96–108)
CO2 SERPL-SCNC: 29 MMOL/L — SIGNIFICANT CHANGE UP (ref 22–31)
CREAT SERPL-MCNC: 1.07 MG/DL — SIGNIFICANT CHANGE UP (ref 0.5–1.3)
D DIMER BLD IA.RAPID-MCNC: 186 NG/ML DDU — SIGNIFICANT CHANGE UP
GLUCOSE SERPL-MCNC: 110 MG/DL — HIGH (ref 70–99)
HCT VFR BLD CALC: 38.2 % — LOW (ref 39–50)
HGB BLD-MCNC: 12.4 G/DL — LOW (ref 13–17)
INR BLD: 0.99 — SIGNIFICANT CHANGE UP (ref 0.88–1.16)
MAGNESIUM SERPL-MCNC: 2.2 MG/DL — SIGNIFICANT CHANGE UP (ref 1.6–2.6)
MCHC RBC-ENTMCNC: 28.8 PG — SIGNIFICANT CHANGE UP (ref 27–34)
MCHC RBC-ENTMCNC: 32.5 GM/DL — SIGNIFICANT CHANGE UP (ref 32–36)
MCV RBC AUTO: 88.8 FL — SIGNIFICANT CHANGE UP (ref 80–100)
NRBC # BLD: 0 /100 WBCS — SIGNIFICANT CHANGE UP (ref 0–0)
PHOSPHATE SERPL-MCNC: 3 MG/DL — SIGNIFICANT CHANGE UP (ref 2.5–4.5)
PLATELET # BLD AUTO: 174 K/UL — SIGNIFICANT CHANGE UP (ref 150–400)
POTASSIUM SERPL-MCNC: 4 MMOL/L — SIGNIFICANT CHANGE UP (ref 3.5–5.3)
POTASSIUM SERPL-SCNC: 4 MMOL/L — SIGNIFICANT CHANGE UP (ref 3.5–5.3)
PROTHROM AB SERPL-ACNC: 11.9 SEC — SIGNIFICANT CHANGE UP (ref 10.6–13.6)
PTH-INTACT IO % DIF SERPL: 74.8 PG/ML — HIGH (ref 8.5–72.5)
RBC # BLD: 4.3 M/UL — SIGNIFICANT CHANGE UP (ref 4.2–5.8)
RBC # FLD: 13 % — SIGNIFICANT CHANGE UP (ref 10.3–14.5)
SODIUM SERPL-SCNC: 141 MMOL/L — SIGNIFICANT CHANGE UP (ref 135–145)
WBC # BLD: 10.85 K/UL — HIGH (ref 3.8–10.5)
WBC # FLD AUTO: 10.85 K/UL — HIGH (ref 3.8–10.5)

## 2021-03-14 PROCEDURE — 93970 EXTREMITY STUDY: CPT | Mod: 26

## 2021-03-14 PROCEDURE — 71045 X-RAY EXAM CHEST 1 VIEW: CPT | Mod: 26

## 2021-03-14 RX ORDER — FAMOTIDINE 10 MG/ML
1 INJECTION INTRAVENOUS
Qty: 0 | Refills: 0 | DISCHARGE
Start: 2021-03-14

## 2021-03-14 RX ORDER — LAMOTRIGINE 25 MG/1
1 TABLET, ORALLY DISINTEGRATING ORAL
Qty: 0 | Refills: 0 | DISCHARGE
Start: 2021-03-14

## 2021-03-14 RX ORDER — LISINOPRIL 2.5 MG/1
1 TABLET ORAL
Qty: 0 | Refills: 0 | DISCHARGE
Start: 2021-03-14

## 2021-03-14 RX ORDER — BUPROPION HYDROCHLORIDE 150 MG/1
1 TABLET, EXTENDED RELEASE ORAL
Qty: 0 | Refills: 0 | DISCHARGE
Start: 2021-03-14

## 2021-03-14 RX ADMIN — Medication 100 MILLIGRAM(S): at 05:47

## 2021-03-14 RX ADMIN — SODIUM CHLORIDE 3 MILLILITER(S): 9 INJECTION INTRAMUSCULAR; INTRAVENOUS; SUBCUTANEOUS at 05:48

## 2021-03-14 RX ADMIN — OXYCODONE AND ACETAMINOPHEN 1 TABLET(S): 5; 325 TABLET ORAL at 03:38

## 2021-03-14 RX ADMIN — SODIUM CHLORIDE 3 MILLILITER(S): 9 INJECTION INTRAMUSCULAR; INTRAVENOUS; SUBCUTANEOUS at 13:53

## 2021-03-14 RX ADMIN — OXYCODONE AND ACETAMINOPHEN 1 TABLET(S): 5; 325 TABLET ORAL at 16:45

## 2021-03-14 RX ADMIN — OXYCODONE AND ACETAMINOPHEN 1 TABLET(S): 5; 325 TABLET ORAL at 11:55

## 2021-03-14 RX ADMIN — BUPROPION HYDROCHLORIDE 75 MILLIGRAM(S): 150 TABLET, EXTENDED RELEASE ORAL at 05:54

## 2021-03-14 RX ADMIN — OXYCODONE AND ACETAMINOPHEN 1 TABLET(S): 5; 325 TABLET ORAL at 07:28

## 2021-03-14 RX ADMIN — OXYCODONE AND ACETAMINOPHEN 1 TABLET(S): 5; 325 TABLET ORAL at 04:10

## 2021-03-14 RX ADMIN — LAMOTRIGINE 100 MILLIGRAM(S): 25 TABLET, ORALLY DISINTEGRATING ORAL at 11:55

## 2021-03-14 RX ADMIN — LISINOPRIL 10 MILLIGRAM(S): 2.5 TABLET ORAL at 05:48

## 2021-03-14 RX ADMIN — HEPARIN SODIUM 5000 UNIT(S): 5000 INJECTION INTRAVENOUS; SUBCUTANEOUS at 05:47

## 2021-03-14 RX ADMIN — FAMOTIDINE 20 MILLIGRAM(S): 10 INJECTION INTRAVENOUS at 11:55

## 2021-03-14 RX ADMIN — OXYCODONE AND ACETAMINOPHEN 1 TABLET(S): 5; 325 TABLET ORAL at 15:45

## 2021-03-14 RX ADMIN — OXYCODONE AND ACETAMINOPHEN 1 TABLET(S): 5; 325 TABLET ORAL at 12:55

## 2021-03-14 NOTE — PROGRESS NOTE ADULT - ASSESSMENT
45y M PMH testicular cancer s/p radical orcheictomy and pelvic radiation (2004), hx of presumed goiter s/p L thyroidectomy (16yo), Schatzki's gastritis and severe GERD, HTN,  hx of provoked PE (dx 2019) on Eliquisx9 months, stopped in Summer of 2020, presenting with increasingly symptomatic multinodular goiter extending substernally, now s/p completion R thyroidectomy and resection of substernal goiter 3/12.       Advance diet as tolerated   Continue HSQ for DVT ppx  Please obtain duplex U/S b/l lower ext before discharge  Please contact surgery team# 1 after duplex has been performed  ALEXANDRA drain will be removed by  Surgery team #1 when duplex is performed       Plan discussed with attending and chief resident.

## 2021-03-14 NOTE — DISCHARGE NOTE PROVIDER - NSDCCPTREATMENT_GEN_ALL_CORE_FT
PRINCIPAL PROCEDURE  Procedure: Surgical removal of thyroid goiter  Findings and Treatment: with mini sternotomy      SECONDARY PROCEDURE  Procedure: Total thyroidectomy  Findings and Treatment:

## 2021-03-14 NOTE — DISCHARGE NOTE NURSING/CASE MANAGEMENT/SOCIAL WORK - PATIENT PORTAL LINK FT
You can access the FollowMyHealth Patient Portal offered by [facility] by registering at the following website: [email or web address]. By joining [facility]’s FollowMyHealth portal, you will also be able to view your health information using other applications (apps) compatible with our system.

## 2021-03-14 NOTE — DISCHARGE NOTE PROVIDER - CARE PROVIDERS DIRECT ADDRESSES
[email or web address]

## 2021-03-14 NOTE — DISCHARGE NOTE PROVIDER - CARE PROVIDER_API CALL
Clark Tinsley)  Surgery; Thoracic Surgery  [address], [address] [place] [address]  Phone: [phone]  Fax: [phone]  Follow Up Time:     Konstantin Varela)  Otolaryngology  [address], [address]  [place] [address]  Phone: [phone]  Fax: [phone]  Follow Up Time:

## 2021-03-14 NOTE — PROGRESS NOTE ADULT - SUBJECTIVE AND OBJECTIVE BOX
Patient discussed on morning rounds with Dr. Phelps     Operation / Date: 3/12/21:  completion R thyroidectomy and substernal goiter resection with hemisternotomy    Surgeon: Dr. Varela/Dr. Tinsley     Referring Physician: Dr. Varela    SUBJECTIVE ASSESSMENT  45y Male seen and examined bedside.  Using IS pulling 3500cc. Ambulating on room air. +BMs. Denies chest pain, SOB, palpitations, N/V/D, abdominal pain, dizziness, fever or chills. Patient is tolerating PO diet, and voiding spontaneously.      AND HOSPITAL COURSE:  45 M PMH testicular cancer s/p radical orcheictomy and pelvic radiation (2004), hx of presumed goiter s/p L thyroidectomy (14yo), Schatzki's gastritis and severe GERD, HTN,  hx of provoked PE (dx 2019) on Eliquisx9 months, stopped in Summer of 2020, presenting with increasingly symptomatic multinodular goiter extending substernally, and deemed a good surgical candidate. On 3/12/21 patient underwent completion R thyroidectomy and substernal goiter resection with nidia-sternotomy with Dr. Varela and Dr. Tinsley. Patient arrived to the CTICU in stable condition with 1 ALEXANDRA drain in place. POD1 patient started on clear liquid diet, tolerated, ambulating and transferred to stepdown unit. Today POD2, LE dopplers negative, ALEXANDRA drain removed by surgery team, diet advanced, and patient deemed stable for discharge home today.     35 minutes was spent with the patient reviewing the discharge material including medications, follow up appointments, recovery, concerning symptoms, and how to contact their health care providers if they have questions      Vital Signs Last 24 Hrs  T(C): 36.8 (14 Mar 2021 13:01), Max: 37.1 (14 Mar 2021 05:01)  T(F): 98.3 (14 Mar 2021 13:01), Max: 98.7 (14 Mar 2021 05:01)  HR: 68 (14 Mar 2021 12:30) (68 - 88)  BP: 117/78 (14 Mar 2021 12:30) (105/68 - 139/81)  BP(mean): 93 (14 Mar 2021 12:30) (76 - 103)  RR: 16 (14 Mar 2021 12:30) (16 - 18)  SpO2: 98% (14 Mar 2021 12:30) (98% - 100%)    EPICARDIAL WIRES REMOVED: n/a  TIE DOWNS REMOVED: yes     PHYSICAL EXAM:  General: Patient OOBTC, no acute distress     Neurological: Alert and oriented. No focal neurological deficits     Cardiovascular: S1S2, RRR, no murmurs appreciated on exam     Respiratory: Clear to ausculation bilaterally, no wheezes, rales or rhonchi    Gastrointestinal: Abdomen soft, non tender, non distended     Extremities: Warm and well perfused. No peripheral edema or calf tenderness bilaterally    Vascular: Peripheral pulses palpable bilaterally    Incision Sites: MSI and cervical incision covered with steri-strips, clean dry and intact, no signs of infection      LABS:                        12.4   10.85 )-----------( 174      ( 14 Mar 2021 06:10 )             38.2       COUMADIN:No.         PT/INR - ( 14 Mar 2021 06:07 )   PT: 11.9 sec;   INR: 0.99          PTT - ( 14 Mar 2021 06:07 )  PTT:25.3 sec    03-14    141  |  105  |  16  ----------------------------<  110<H>  4.0   |  29  |  1.07    Ca    8.7      14 Mar 2021 06:10  Phos  3.0     03-14  Mg     2.2     03-14    TPro  6.3  /  Alb  3.9  /  TBili  0.3  /  DBili  x   /  AST  16  /  ALT  20  /  AlkPhos  53  03-13          Discharge CXR:  < from: Xray Chest 1 View- PORTABLE-Routine (Xray Chest 1 View- PORTABLE-Routine in AM.) (03.14.21 @ 05:14) >    EXAM:  XR CHEST PORTABLE ROUTINE 1V                          PROCEDURE DATE:  03/14/2021          INTERPRETATION:  Clinical History: Postop    Frontal examination of the chest demonstrates the heart to be within normal limits in transverse diameter. No acute infiltrates. Mild elevation right hemidiaphragm. Status post sternotomy. Degenerative changes thoracic spine.    Impression: No acute infiltrates    < end of copied text >

## 2021-03-14 NOTE — PROGRESS NOTE ADULT - REASON FOR ADMISSION
Elective resection of substernal goiter, R thyroidectomy

## 2021-03-14 NOTE — PROGRESS NOTE ADULT - SUBJECTIVE AND OBJECTIVE BOX
HX: POD1 s/p completion R thyroidectomy and resection of substernal goiter    SUBJECTIVE:  Patient seen and examined bedside by chief resident. Patient reports that he is tolerating diet without shortness of breat or difficulty swallowing . Patient reports incisional pain has improved. Denies CP, HA, dizziness, calf pain. Denies N/V.       heparin   Injectable 5000 Unit(s) SubCutaneous every 8 hours  lisinopril 10 milliGRAM(s) Oral daily      Vital Signs Last 24 Hrs  T(C): 36.8 (14 Mar 2021 09:01), Max: 37.1 (14 Mar 2021 05:01)  T(F): 98.3 (14 Mar 2021 09:01), Max: 98.7 (14 Mar 2021 05:01)  HR: 72 (14 Mar 2021 08:20) (64 - 88)  BP: 105/68 (14 Mar 2021 08:20) (105/68 - 140/87)  BP(mean): 82 (14 Mar 2021 08:20) (76 - 108)  RR: 16 (14 Mar 2021 08:20) (16 - 18)  SpO2: 99% (14 Mar 2021 08:20) (98% - 100%)  I&O's Detail    13 Mar 2021 06:01  -  14 Mar 2021 07:00  --------------------------------------------------------  IN:    IV PiggyBack: 100 mL  Total IN: 100 mL    OUT:    Bulb (mL): 40 mL    Voided (mL): 400 mL  Total OUT: 440 mL    Total NET: -340 mL          General: NAD, resting comfortably in bed  HEENT: clean dry gauze dressings, TTP over sternum at inferior border of wound, ALEXANDRA SS   C/V: NSR  Pulm: Nonlabored breathing, no respiratory distress  Abd: soft, NT/ND.  Extrem: WWP, no edema, SCDs in place          LABS:                        12.4   10.85 )-----------( 174      ( 14 Mar 2021 06:10 )             38.2     03-14    141  |  105  |  16  ----------------------------<  110<H>  4.0   |  29  |  1.07    Ca    8.7      14 Mar 2021 06:10  Phos  3.0     03-14  Mg     2.2     03-14    TPro  6.3  /  Alb  3.9  /  TBili  0.3  /  DBili  x   /  AST  16  /  ALT  20  /  AlkPhos  53  03-13    PT/INR - ( 14 Mar 2021 06:07 )   PT: 11.9 sec;   INR: 0.99          PTT - ( 14 Mar 2021 06:07 )  PTT:25.3 sec

## 2021-03-14 NOTE — DISCHARGE NOTE PROVIDER - NSDCFUADDINST_GEN_ALL_CORE_FT
-Walk daily as tolerated and use your incentive spirometer every hour.  -No driving or strenuous activity/exercise or until cleared by your surgeon.  -Gently clean your incisions with anti-bacterial soap and water, pat dry.  You may leave them open to air.  -Call your doctor if you have shortness of breath, chest pain not relieved by pain medication, dizziness, fever >101.5, or increased redness or drainage from incisions.

## 2021-03-14 NOTE — DISCHARGE NOTE PROVIDER - NSDCMRMEDTOKEN_GEN_ALL_CORE_FT
buPROPion 75 mg oral tablet: 1 tab(s) orally 2 times a day  famotidine 20 mg oral tablet: 1 tab(s) orally once a day  lamoTRIgine 100 mg oral tablet: 1 tab(s) orally once a day  lisinopril 10 mg oral tablet: 1 tab(s) orally once a day   buPROPion 75 mg oral tablet: 1 tab(s) orally 2 times a day  famotidine 20 mg oral tablet: 1 tab(s) orally once a day  lamoTRIgine 100 mg oral tablet: 1 tab(s) orally once a day  lisinopril 10 mg oral tablet: 1 tab(s) orally once a day  oxycodone-acetaminophen 5 mg-325 mg oral tablet: 1 tab(s) orally every 4 hours, As needed, Severe Pain (7 - 10) MDD:Do not exceed 6 tabs per day

## 2021-03-14 NOTE — DISCHARGE NOTE NURSING/CASE MANAGEMENT/SOCIAL WORK - NSDCFUADDAPPT_GEN_ALL_CORE_FT
-Please follow up with  ___on ___.  The office is located at [facility], [facility], 4th floor. Call us with any questions #[phone].

## 2021-03-14 NOTE — DISCHARGE NOTE PROVIDER - NSDCFUADDAPPT_GEN_ALL_CORE_FT
-Please follow up with  ___on ___.  The office is located at [facility], [facility], 4th floor. Call us with any questions #[phone].   -Please follow up with Dr. Tinsley within 1-2 weeks from the date of discharge.  The office is located at [facility], [facility], 4th floor. Call us with any questions #[phone]. Please call on Monday to schedule your appointment.   - Please follow up with Dr. Varela within 1-2 weeks from the date of discharge. Please call on Monday to schedule your appointment.

## 2021-03-14 NOTE — DISCHARGE NOTE PROVIDER - HOSPITAL COURSE
41 M PMH testicular cancer s/p radical orcheictomy and pelvic radiation (2004), hx of presumed goiter s/p L thyroidectomy (14yo), Schatzki's gastritis and severe GERD, HTN,  hx of provoked PE (dx 2019) on Eliquisx9 months, stopped in Summer of 2020, presenting with increasingly symptomatic multinodular goiter extending substernally, and deemed a good surgical candidate. On 3/12/21 patient underwent completion R thyroidectomy and substernal goiter resection with nidia-sternotomy with Dr. Varela and Dr. Tinsley. Patient arrived to the CTICU in stable condition with 1 ALEXANDRA drain in place. POD1 patient started on clear liquid diet, tolerated, ambulating and transferred to stepdown unit. Today POD2, LE dopplers negative, ALEXANDRA drain removed by surgery team, diet advanced, and patient deemed stable for discharge home today.     35 minutes was spent with the patient reviewing the discharge material including medications, follow up appointments, recovery, concerning symptoms, and how to contact their health care providers if they have questions

## 2021-03-15 PROBLEM — I10 ESSENTIAL (PRIMARY) HYPERTENSION: Chronic | Status: ACTIVE | Noted: 2021-03-11

## 2021-03-15 PROBLEM — K21.9 GASTRO-ESOPHAGEAL REFLUX DISEASE WITHOUT ESOPHAGITIS: Chronic | Status: ACTIVE | Noted: 2021-03-11

## 2021-03-15 PROBLEM — M54.5 LOW BACK PAIN: Chronic | Status: ACTIVE | Noted: 2021-03-11

## 2021-03-15 PROBLEM — F31.9 BIPOLAR DISORDER, UNSPECIFIED: Chronic | Status: ACTIVE | Noted: 2021-03-11

## 2021-03-15 PROBLEM — E04.9 NONTOXIC GOITER, UNSPECIFIED: Chronic | Status: ACTIVE | Noted: 2021-03-11

## 2021-03-16 LAB — SURGICAL PATHOLOGY STUDY: SIGNIFICANT CHANGE UP

## 2021-03-18 ENCOUNTER — APPOINTMENT (OUTPATIENT)
Dept: OTOLARYNGOLOGY | Facility: CLINIC | Age: 46
End: 2021-03-18
Payer: COMMERCIAL

## 2021-03-18 VITALS
TEMPERATURE: 98.2 F | DIASTOLIC BLOOD PRESSURE: 85 MMHG | OXYGEN SATURATION: 99 % | HEART RATE: 78 BPM | SYSTOLIC BLOOD PRESSURE: 131 MMHG

## 2021-03-18 DIAGNOSIS — J04.0 ACUTE LARYNGITIS: ICD-10-CM

## 2021-03-18 DIAGNOSIS — R49.9 UNSPECIFIED VOICE AND RESONANCE DISORDER: ICD-10-CM

## 2021-03-18 DIAGNOSIS — E04.9 NONTOXIC GOITER, UNSPECIFIED: ICD-10-CM

## 2021-03-18 DIAGNOSIS — E89.0 POSTPROCEDURAL HYPOTHYROIDISM: ICD-10-CM

## 2021-03-18 DIAGNOSIS — K21.9 ACUTE LARYNGITIS: ICD-10-CM

## 2021-03-18 DIAGNOSIS — Z87.898 PERSONAL HISTORY OF OTHER SPECIFIED CONDITIONS: ICD-10-CM

## 2021-03-18 DIAGNOSIS — L90.5 SCAR CONDITIONS AND FIBROSIS OF SKIN: ICD-10-CM

## 2021-03-18 PROCEDURE — 99024 POSTOP FOLLOW-UP VISIT: CPT

## 2021-03-18 PROCEDURE — 31575 DIAGNOSTIC LARYNGOSCOPY: CPT | Mod: 58

## 2021-03-18 RX ORDER — ACETAMINOPHEN AND CODEINE 300; 30 MG/1; MG/1
300-30 TABLET ORAL
Qty: 12 | Refills: 0 | Status: COMPLETED | COMMUNITY
Start: 2021-03-11 | End: 2021-03-18

## 2021-03-18 RX ORDER — AZITHROMYCIN 500 MG/1
500 TABLET, FILM COATED ORAL DAILY
Qty: 1 | Refills: 0 | Status: COMPLETED | COMMUNITY
Start: 2021-03-11 | End: 2021-03-18

## 2021-03-22 LAB
ALBUMIN SERPL ELPH-MCNC: 4.5 G/DL
CALCIUM SERPL-MCNC: 9.7 MG/DL
CALCIUM SERPL-MCNC: 9.7 MG/DL
PARATHYROID HORMONE INTACT: 42 PG/ML
PHOSPHATE SERPL-MCNC: 3.7 MG/DL

## 2021-03-25 ENCOUNTER — OUTPATIENT (OUTPATIENT)
Dept: OUTPATIENT SERVICES | Facility: HOSPITAL | Age: 46
LOS: 1 days | End: 2021-03-25
Payer: COMMERCIAL

## 2021-03-25 ENCOUNTER — APPOINTMENT (OUTPATIENT)
Dept: THORACIC SURGERY | Facility: CLINIC | Age: 46
End: 2021-03-25
Payer: COMMERCIAL

## 2021-03-25 VITALS
WEIGHT: 188 LBS | HEIGHT: 72 IN | RESPIRATION RATE: 18 BRPM | HEART RATE: 80 BPM | TEMPERATURE: 98 F | BODY MASS INDEX: 25.47 KG/M2 | SYSTOLIC BLOOD PRESSURE: 110 MMHG | OXYGEN SATURATION: 99 % | DIASTOLIC BLOOD PRESSURE: 76 MMHG

## 2021-03-25 DIAGNOSIS — Z90.89 ACQUIRED ABSENCE OF OTHER ORGANS: Chronic | ICD-10-CM

## 2021-03-25 DIAGNOSIS — Z90.79 ACQUIRED ABSENCE OF OTHER GENITAL ORGAN(S): Chronic | ICD-10-CM

## 2021-03-25 DIAGNOSIS — E89.0 POSTPROCEDURAL HYPOTHYROIDISM: Chronic | ICD-10-CM

## 2021-03-25 PROCEDURE — 71046 X-RAY EXAM CHEST 2 VIEWS: CPT

## 2021-03-25 PROCEDURE — 99024 POSTOP FOLLOW-UP VISIT: CPT

## 2021-03-25 PROCEDURE — 71046 X-RAY EXAM CHEST 2 VIEWS: CPT | Mod: 26

## 2021-04-07 PROCEDURE — C1889: CPT

## 2021-04-07 PROCEDURE — 84295 ASSAY OF SERUM SODIUM: CPT

## 2021-04-07 PROCEDURE — 83970 ASSAY OF PARATHORMONE: CPT

## 2021-04-07 PROCEDURE — 84100 ASSAY OF PHOSPHORUS: CPT

## 2021-04-07 PROCEDURE — 85027 COMPLETE CBC AUTOMATED: CPT

## 2021-04-07 PROCEDURE — 36415 COLL VENOUS BLD VENIPUNCTURE: CPT

## 2021-04-07 PROCEDURE — 71045 X-RAY EXAM CHEST 1 VIEW: CPT

## 2021-04-07 PROCEDURE — 80053 COMPREHEN METABOLIC PANEL: CPT

## 2021-04-07 PROCEDURE — 93970 EXTREMITY STUDY: CPT

## 2021-04-07 PROCEDURE — 86850 RBC ANTIBODY SCREEN: CPT

## 2021-04-07 PROCEDURE — 86900 BLOOD TYPING SEROLOGIC ABO: CPT

## 2021-04-07 PROCEDURE — 86923 COMPATIBILITY TEST ELECTRIC: CPT

## 2021-04-07 PROCEDURE — 80048 BASIC METABOLIC PNL TOTAL CA: CPT

## 2021-04-07 PROCEDURE — 82803 BLOOD GASES ANY COMBINATION: CPT

## 2021-04-07 PROCEDURE — 85610 PROTHROMBIN TIME: CPT

## 2021-04-07 PROCEDURE — 83735 ASSAY OF MAGNESIUM: CPT

## 2021-04-07 PROCEDURE — 85025 COMPLETE CBC W/AUTO DIFF WBC: CPT

## 2021-04-07 PROCEDURE — 85730 THROMBOPLASTIN TIME PARTIAL: CPT

## 2021-04-07 PROCEDURE — 84132 ASSAY OF SERUM POTASSIUM: CPT

## 2021-04-07 PROCEDURE — 82330 ASSAY OF CALCIUM: CPT

## 2021-04-07 PROCEDURE — 85379 FIBRIN DEGRADATION QUANT: CPT

## 2021-04-07 PROCEDURE — 86901 BLOOD TYPING SEROLOGIC RH(D): CPT

## 2021-04-14 NOTE — PHYSICAL EXAM
[Normal] : no mass and no adenopathy [de-identified] : The neck is flat without seroma or hematoma. The subcuticular suture was removed. The sternal wound is clean and healing well so far. The voice is raspy.  A Chvostek sign was not present.

## 2021-04-14 NOTE — PROCEDURE
[Image(s) Captured] : image(s) captured and filed [Unable to Cooperate with Mirror] : patient unable to cooperate with mirror [Gag Reflex] : gag reflex preventing mirror examination [Topical Lidocaine] : topical lidocaine [Oxymetazoline HCl] : oxymetazoline HCl [Flexible Endoscope] : examined with the flexible endoscope [Hoarseness] : hoarseness not clearly evaluated by indirect laryngoscopy [Serial Number: ___] : Serial Number: [unfilled] [de-identified] : The nasal septum is minimally deviated to the left. There are no masses or polyps and the nasal mucosa and secretions are normal. The choanae and posterior nasopharynx are normal without masses or drainage. The Eustachian tube orifices appear patent. The pharynx, including the posterior and lateral pharyngeal walls, the vallecula and base of tongue are normal without ulcerations, lesions or masses. The hypopharynx including the pyriform sinuses open well without pooling of secretions, mucosal lesions or masses. The supraglottic larynx including the epiglottis, petiole, glossoepiglottic folds and pharyngoepiglottic folds are normal without mucosal lesions, ulcerations or masses. The glottis is slightly rotated and reveals normal false vocal folds. The true vocal folds are hyperemic but tense and of equal length, without paralysis, having symmetric mobility on adduction and abduction. There are no mucosal lesions, nodules, cysts, erythroplasia or leukoplakia. The posterior cricoid area has healthy pink mucosa in the interarytenoid area and esophageal inlet. There is slight thickening/edema of the interarytenoid mucosa or erythema suggestive of posterior laryngitis from laryngopharyngeal acid reflux disease. The trachea is clear.\par  [de-identified] : postoperative assessment after completion right total thyroidectomy and sternal split for substernal goiter.

## 2021-04-14 NOTE — CONSULT LETTER
[Dear  ___] : Dear  [unfilled], [Consult Letter:] : I had the pleasure of evaluating your patient, [unfilled]. [Please see my note below.] : Please see my note below. [Consult Closing:] : Thank you very much for allowing me to participate in the care of this patient.  If you have any questions, please do not hesitate to contact me. [Sincerely,] : Sincerely, [DrAmmon  ___] : Dr. GLASER [Konstantin Varela MD, FACS] : Konstantin Varela MD, FACS [Director] : Director [[facility]] : [facility]  [[facility]] : [facility] [[facility]] : [facility]  [DrAmmon ___] : Dr. GLASER [FreeTextEntry3] : \par Konstantin Varela M.D., FACS, ECNU\par Director [facility]\par [facility]\par Certified in Thyroid/Parathyroid/Neck Ultrasound, ECNU/ AIUM\par \par , Department of Otolaryngology\par [facility] [employer] [facility]\par

## 2021-04-14 NOTE — REASON FOR VISIT
[FreeTextEntry2] : a first postop visit after completion total thyroidectomy and excision of mediastinal goiter with sternotomy. [FreeTextEntry1] : Referred by Diego Neri MD Endocrinologist,  PCP Brayden Hess MD

## 2021-04-14 NOTE — HISTORY OF PRESENT ILLNESS
[de-identified] : Renny is a generally healthy 41-year-old male  who had a left thyroid lobectomy at the age of 15, for a presumed goiter and not malignant. soon after his thyroidectomy he started taking Levothyroxine for unclear reasons but over the years has continued taking it and there has been progressive growth of the right thyroid lobe with increasing symptoms related to pressure with a globus sensation.  He denies true dysphagia but had an upper GI endoscopy for severe GERD 2 years ago and found to have a Schatzki's ring that was dilated and he had another dilation 6 months ago. He denies recent voice changes, significant shortness of breath other than related to deconditioning. He snores but denies excessive daytime sleepiness.  He is taking Ritalin for ADD.  He is able to lie flat without dyspnea.  He denies any pain in the neck but has a sense of pressure discomfort that occurs regularly ans especially when swallowing.  His weight has been stable and he is not sure if there had been more growth of the gland after stopping LT4 in 2015 for ~ 1 year.  His TSH has become more suppressed with the growth and is now near the lower limit of normal with a normal free T4 on last blood testing.   He noted that after his last US of the neck on 03/28/17, he had exacerbation of symptoms with severe pressure sensation and was found to have a right thyroid lobe measuring 9.5 x 2.9 x 5 CM. There is a probable cluster of solid and cystic nodules in the mid to lower pole measuring 3.2 x 2.5 x 2.7 CM as well as an exophytic lower pole nodule extending to the left of midline measuring 7 x 3.3 x 6.8 CM. There has been significant growth since he was last evaluated previously measuring 5.9 x 3.4 for the cluster of nodules in the lower pole and 3.6 x 2.6 CM for the exophytic lower pole nodule. his mother has thyroid nodules that are being followed and his grandmother maternal grandmother had her thyroid removed presumably for goiter but he is not aware of any family history of thyroid cancer. He has no known radiation exposure is ankle up in [facility] not near a nuclear power plant.  However, he was treated for testicular cancer in 2004 with a radical orchiectomy and pelvic radiation.  He has congenital deafness AS.   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  He denies paresthesias and is not taking any calcium or vitamin D supplements.  Hospital discharge calcium and PTH were normal. He is tolerating a soft diet.  Voice is still mildly hoarse. He has minimal pain now. Surgical pathology is consistent for both parts with benign multinodular hyperplasia with fibrosis, calcification and ossification. \par

## 2021-04-29 ENCOUNTER — APPOINTMENT (OUTPATIENT)
Dept: OTOLARYNGOLOGY | Facility: CLINIC | Age: 46
End: 2021-04-29
Payer: COMMERCIAL

## 2021-04-29 VITALS
SYSTOLIC BLOOD PRESSURE: 129 MMHG | DIASTOLIC BLOOD PRESSURE: 73 MMHG | TEMPERATURE: 98.3 F | OXYGEN SATURATION: 98 % | HEART RATE: 117 BPM

## 2021-04-29 PROCEDURE — 99024 POSTOP FOLLOW-UP VISIT: CPT

## 2021-04-29 NOTE — REASON FOR VISIT
[FreeTextEntry2] : a 2nd postop visit after completion total thyroidectomy and excision of mediastinal goiter with sternotomy. [FreeTextEntry1] : Referred by Diego Neri MD Endocrinologist,  PCP Brayden Hess MD

## 2021-04-29 NOTE — HISTORY OF PRESENT ILLNESS
[de-identified] : Renny is a generally healthy 41-year-old male  who had a left thyroid lobectomy at the age of 15, for a presumed goiter and not malignant. soon after his thyroidectomy he started taking Levothyroxine for unclear reasons but over the years has continued taking it and there has been progressive growth of the right thyroid lobe with increasing symptoms related to pressure with a globus sensation.  He denies true dysphagia but had an upper GI endoscopy for severe GERD 2 years ago and found to have a Schatzki's ring that was dilated and he had another dilation 6 months ago. He denies recent voice changes, significant shortness of breath other than related to deconditioning. He snores but denies excessive daytime sleepiness.  He is taking Ritalin for ADD.  He is able to lie flat without dyspnea.  He denies any pain in the neck but has a sense of pressure discomfort that occurs regularly ans especially when swallowing.  His weight has been stable and he is not sure if there had been more growth of the gland after stopping LT4 in 2015 for ~ 1 year.  His TSH has become more suppressed with the growth and is now near the lower limit of normal with a normal free T4 on last blood testing.   He noted that after his last US of the neck on 03/28/17, he had exacerbation of symptoms with severe pressure sensation and was found to have a right thyroid lobe measuring 9.5 x 2.9 x 5 CM. There is a probable cluster of solid and cystic nodules in the mid to lower pole measuring 3.2 x 2.5 x 2.7 CM as well as an exophytic lower pole nodule extending to the left of midline measuring 7 x 3.3 x 6.8 CM. There has been significant growth since he was last evaluated previously measuring 5.9 x 3.4 for the cluster of nodules in the lower pole and 3.6 x 2.6 CM for the exophytic lower pole nodule. his mother has thyroid nodules that are being followed and his grandmother maternal grandmother had her thyroid removed presumably for goiter but he is not aware of any family history of thyroid cancer. He has no known radiation exposure is ankle up in [facility] not near a nuclear power plant.  However, he was treated for testicular cancer in 2004 with a radical orchiectomy and pelvic radiation.  He has congenital deafness AS.   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  He denies paresthesias and is not taking any calcium or vitamin D supplements.  Hospital discharge calcium and PTH were normal. He is tolerating a regular diet.  Voice is less hoarse. Surgical pathology is consistent for both parts with benign multinodular hyperplasia with fibrosis, calcification and ossification. \par

## 2021-04-29 NOTE — CONSULT LETTER
[Dear  ___] : Dear  [unfilled], [Consult Letter:] : I had the pleasure of evaluating your patient, [unfilled]. [Please see my note below.] : Please see my note below. [Consult Closing:] : Thank you very much for allowing me to participate in the care of this patient.  If you have any questions, please do not hesitate to contact me. [Sincerely,] : Sincerely, [DrAmmon  ___] : Dr. GLASER [DrAmmon ___] : Dr. GLASER [Konstantin Varela MD, FACS] : Konstantin Varela MD, FACS [Director] : Director [[facility]] : [facility]  [[facility]] : [facility] [[facility]] : [facility]  [FreeTextEntry3] : \par Konstantin Varela M.D., FACS, ECNU\par Director [facility]\par [facility]\par Certified in Thyroid/Parathyroid/Neck Ultrasound, ECNU/ AIUM\par \par , Department of Otolaryngology\par [facility] [employer] [facility] at [facility]\par

## 2021-04-30 DIAGNOSIS — E89.0 POSTPROCEDURAL HYPOTHYROIDISM: ICD-10-CM

## 2021-04-30 LAB
T4 FREE SERPL-MCNC: 2 NG/DL
TSH SERPL-ACNC: 2.08 UIU/ML

## 2021-06-10 ENCOUNTER — APPOINTMENT (OUTPATIENT)
Dept: OTOLARYNGOLOGY | Facility: CLINIC | Age: 46
End: 2021-06-10
Payer: COMMERCIAL

## 2021-06-10 VITALS
HEIGHT: 72 IN | OXYGEN SATURATION: 98 % | BODY MASS INDEX: 25.19 KG/M2 | SYSTOLIC BLOOD PRESSURE: 130 MMHG | HEART RATE: 87 BPM | DIASTOLIC BLOOD PRESSURE: 80 MMHG | WEIGHT: 186 LBS | TEMPERATURE: 98.1 F

## 2021-06-10 DIAGNOSIS — R51.9 HEADACHE, UNSPECIFIED: ICD-10-CM

## 2021-06-10 DIAGNOSIS — H90.5 UNSPECIFIED SENSORINEURAL HEARING LOSS: ICD-10-CM

## 2021-06-10 DIAGNOSIS — R42 DIZZINESS AND GIDDINESS: ICD-10-CM

## 2021-06-10 DIAGNOSIS — H61.22 IMPACTED CERUMEN, LEFT EAR: ICD-10-CM

## 2021-06-10 DIAGNOSIS — R26.89 OTHER ABNORMALITIES OF GAIT AND MOBILITY: ICD-10-CM

## 2021-06-10 DIAGNOSIS — H91.90 UNSPECIFIED HEARING LOSS, UNSPECIFIED EAR: ICD-10-CM

## 2021-06-10 DIAGNOSIS — H93.19 TINNITUS, UNSPECIFIED EAR: ICD-10-CM

## 2021-06-10 PROCEDURE — G0268 REMOVAL OF IMPACTED WAX MD: CPT

## 2021-06-10 PROCEDURE — 92557 COMPREHENSIVE HEARING TEST: CPT

## 2021-06-10 PROCEDURE — 92550 TYMPANOMETRY & REFLEX THRESH: CPT

## 2021-06-10 PROCEDURE — 99213 OFFICE O/P EST LOW 20 MIN: CPT | Mod: 25

## 2021-06-10 PROCEDURE — 99072 ADDL SUPL MATRL&STAF TM PHE: CPT

## 2021-06-10 NOTE — HISTORY OF PRESENT ILLNESS
[de-identified] : 44 yo man c/o last week had head pressure and developed tinnitus in b ears had severe reflux attack and switched to prevacid and pmd said to switch back to pepcid and nasonex- -relieved pressure and hearing improved. yesterday noticed pressure in head and hearing went down. had thyroid removed 3 mo ago had to take oxycodone and got tinnitus from it - had metallic ringing and severe hl and it get better - felt hearing go out. He is also c/o dizzines. I reassured him that  prevacid does not exacerbate reflux, and that oxycodone does not exacerbate tinnitus. Referred by Dr Varela. 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

## 2021-06-10 NOTE — PHYSICAL EXAM
[de-identified] : r nl; l copious cerumen removed under microscope atraumatically with hook>> nl [Normal] : no rashes [de-identified] : gait steady

## 2021-06-10 NOTE — ASSESSMENT
[FreeTextEntry1] : cerumen removed\par ear felt better\par  normal on r l no hearing (known)\par dizziness - vng and mri ordered\par rtc with these tests\par reassured about

## 2021-06-10 NOTE — PROCEDURE
[Cerumen Impaction] : Cerumen Impaction [Same] : same as the Pre Op Dx. [] : Removal of Cerumen [FreeTextEntry6] : l copious cerumen removed atraumatically with hook

## 2021-07-29 ENCOUNTER — APPOINTMENT (OUTPATIENT)
Dept: OTOLARYNGOLOGY | Facility: CLINIC | Age: 46
End: 2021-07-29
Payer: COMMERCIAL

## 2021-07-29 ENCOUNTER — APPOINTMENT (OUTPATIENT)
Dept: THORACIC SURGERY | Facility: CLINIC | Age: 46
End: 2021-07-29
Payer: COMMERCIAL

## 2021-07-29 VITALS
WEIGHT: 193 LBS | HEIGHT: 72 IN | DIASTOLIC BLOOD PRESSURE: 85 MMHG | SYSTOLIC BLOOD PRESSURE: 118 MMHG | TEMPERATURE: 98.2 F | HEART RATE: 97 BPM | OXYGEN SATURATION: 99 % | RESPIRATION RATE: 17 BRPM | BODY MASS INDEX: 26.14 KG/M2

## 2021-07-29 DIAGNOSIS — R06.83 SNORING: ICD-10-CM

## 2021-07-29 DIAGNOSIS — E89.0 POSTPROCEDURAL HYPOTHYROIDISM: ICD-10-CM

## 2021-07-29 DIAGNOSIS — J39.8 OTHER SPECIFIED DISEASES OF UPPER RESPIRATORY TRACT: ICD-10-CM

## 2021-07-29 DIAGNOSIS — E04.9 NONTOXIC GOITER, UNSPECIFIED: ICD-10-CM

## 2021-07-29 PROCEDURE — 99214 OFFICE O/P EST MOD 30 MIN: CPT | Mod: 25

## 2021-07-29 PROCEDURE — 31575 DIAGNOSTIC LARYNGOSCOPY: CPT

## 2021-07-29 NOTE — PROCEDURE
[Image(s) Captured] : image(s) captured and filed [Unable to Cooperate with Mirror] : patient unable to cooperate with mirror [Gag Reflex] : gag reflex preventing mirror examination [Hoarseness] : hoarseness not clearly evaluated by indirect laryngoscopy [Topical Lidocaine] : topical lidocaine [Oxymetazoline HCl] : oxymetazoline HCl [Flexible Endoscope] : examined with the flexible endoscope [Serial Number: ___] : Serial Number: [unfilled] [de-identified] : The nasal septum is minimally deviated to the left. There are no masses or polyps and the nasal mucosa and secretions are normal. The choanae and posterior nasopharynx are normal without masses or drainage. The Eustachian tube orifices appear patent. The pharynx, including the posterior and lateral pharyngeal walls, the vallecula and base of tongue are normal without ulcerations, lesions or masses. The hypopharynx including the pyriform sinuses open well without pooling of secretions, mucosal lesions or masses. The supraglottic larynx including the epiglottis, petiole, glossoepiglottic folds and pharyngoepiglottic folds are normal without mucosal lesions, ulcerations or masses. The glottis is no longer rotated.  The true vocal folds are pristine white and of equal length, without paralysis, having symmetric mobility on adduction and abduction. There are no mucosal lesions, nodules, cysts, erythroplasia or leukoplakia. The posterior cricoid area has healthy pink mucosa in the interarytenoid area and esophageal inlet. There is moderate thickening/edema of the interarytenoid mucosa or erythema suggestive of posterior laryngitis from laryngopharyngeal acid reflux disease. The trachea is clear.\par  [de-identified] : postoperative assessment after completion right total thyroidectomy and sternal split for substernal goiter.

## 2021-07-29 NOTE — REASON FOR VISIT
[FreeTextEntry2] : a follow up visit after completion total thyroidectomy and excision of mediastinal goiter with sternotomy. [FreeTextEntry1] : Referred by Diego Neri MD Endocrinologist,  PCP Brayden Hess MD

## 2021-07-29 NOTE — HISTORY OF PRESENT ILLNESS
[de-identified] : Renny is a generally healthy 41-year-old male  who had a left thyroid lobectomy at the age of 15, for a presumed goiter and not malignant. soon after his thyroidectomy he started taking Levothyroxine for unclear reasons but over the years has continued taking it and there has been progressive growth of the right thyroid lobe with increasing symptoms related to pressure with a globus sensation.  He denies true dysphagia but had an upper GI endoscopy for severe GERD 2 years ago and found to have a Schatzki's ring that was dilated and he had another dilation 6 months ago. He denies recent voice changes, significant shortness of breath other than related to deconditioning. He snores but denies excessive daytime sleepiness.  He is taking Ritalin for ADD.  He is able to lie flat without dyspnea.  He denies any pain in the neck but has a sense of pressure discomfort that occurs regularly ans especially when swallowing.  His weight has been stable and he is not sure if there had been more growth of the gland after stopping LT4 in 2015 for ~ 1 year.  His TSH has become more suppressed with the growth and is now near the lower limit of normal with a normal free T4 on last blood testing.   He noted that after his last US of the neck on 03/28/17, he had exacerbation of symptoms with severe pressure sensation and was found to have a right thyroid lobe measuring 9.5 x 2.9 x 5 CM. There is a probable cluster of solid and cystic nodules in the mid to lower pole measuring 3.2 x 2.5 x 2.7 CM as well as an exophytic lower pole nodule extending to the left of midline measuring 7 x 3.3 x 6.8 CM. There has been significant growth since he was last evaluated previously measuring 5.9 x 3.4 for the cluster of nodules in the lower pole and 3.6 x 2.6 CM for the exophytic lower pole nodule. his mother has thyroid nodules that are being followed and his grandmother maternal grandmother had her thyroid removed presumably for goiter but he is not aware of any family history of thyroid cancer. He has no known radiation exposure is ankle up in [facility] not near a nuclear power plant.  However, he was treated for testicular cancer in 2004 with a radical orchiectomy and pelvic radiation.  He has congenital deafness AS.   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  He denies paresthesias and is not taking any calcium or vitamin D supplements.  Hospital discharge calcium and PTH were normal. He is tolerating a regular diet.  Voice is less hoarse. Surgical pathology is consistent for both parts with benign multinodular hyperplasia with fibrosis, calcification and ossification. His TSH in April was 2.08 but T4 free was elevated at 2.0.  He will have repeat blood testing today as there may have been a lab error based on these numbers.  Reports that since going on vacation he feels that his short-term memory especially for names has been worse.  He gained a few pounds during vacation but otherwise his weight is stable. denies fever, body aches, cough, cyanosis, chest burning, anosmia or recent known COVID exposures.  All family members at home are well.  He has been vaccinated. He has has episodic GERD.  He has a upper GI endoscopy 4 weeks ago.  He has a hiatal hernia and a Schatzki's ring being followed.  Has used Prilosec and Pepcid AC as needed for GERD. \par

## 2021-07-29 NOTE — CONSULT LETTER
[Dear  ___] : Dear  [unfilled], [Consult Letter:] : I had the pleasure of evaluating your patient, [unfilled]. [Please see my note below.] : Please see my note below. [Consult Closing:] : Thank you very much for allowing me to participate in the care of this patient.  If you have any questions, please do not hesitate to contact me. [Sincerely,] : Sincerely, [DrAmmon  ___] : Dr. GLASER [DrAmmon ___] : Dr. GLASER [Konstantin Varela MD, FACS] : Konstantin Varela MD, FACS [Director] : Director [[facility]] : [facility]  [[facility]] : [facility] [[facility]] : [facility]  [FreeTextEntry3] : \par Konstantin Varela M.D., FACS, ECNU\par Director [facility]\par [facility] [facility]\par Certified in Thyroid/Parathyroid/Neck Ultrasound, ECNU/ AIUM\par \par , Department of Otolaryngology\par [facility] [employer] [facility] at [facility]\par   Patient/Caregiver provided printed discharge information.

## 2021-08-02 LAB
T3FREE SERPL-MCNC: 3.7 PG/ML
T4 FREE SERPL-MCNC: 2.3 NG/DL
TSH SERPL-ACNC: 2.68 UIU/ML

## 2021-08-05 ENCOUNTER — APPOINTMENT (OUTPATIENT)
Dept: SLEEP CENTER | Facility: HOME HEALTH | Age: 46
End: 2021-08-05
Payer: COMMERCIAL

## 2021-08-05 ENCOUNTER — OUTPATIENT (OUTPATIENT)
Dept: OUTPATIENT SERVICES | Facility: HOSPITAL | Age: 46
LOS: 1 days | End: 2021-08-05
Payer: COMMERCIAL

## 2021-08-05 DIAGNOSIS — Z90.89 ACQUIRED ABSENCE OF OTHER ORGANS: Chronic | ICD-10-CM

## 2021-08-05 DIAGNOSIS — Z90.79 ACQUIRED ABSENCE OF OTHER GENITAL ORGAN(S): Chronic | ICD-10-CM

## 2021-08-05 DIAGNOSIS — E89.0 POSTPROCEDURAL HYPOTHYROIDISM: Chronic | ICD-10-CM

## 2021-08-05 PROCEDURE — 95800 SLP STDY UNATTENDED: CPT | Mod: 26

## 2021-08-05 PROCEDURE — 95800 SLP STDY UNATTENDED: CPT

## 2021-08-09 ENCOUNTER — APPOINTMENT (OUTPATIENT)
Dept: OTOLARYNGOLOGY | Facility: CLINIC | Age: 46
End: 2021-08-09
Payer: COMMERCIAL

## 2021-08-09 VITALS
BODY MASS INDEX: 26.14 KG/M2 | WEIGHT: 193 LBS | HEIGHT: 72 IN | HEART RATE: 76 BPM | OXYGEN SATURATION: 98 % | DIASTOLIC BLOOD PRESSURE: 81 MMHG | TEMPERATURE: 98.5 F | SYSTOLIC BLOOD PRESSURE: 114 MMHG

## 2021-08-09 DIAGNOSIS — R42 DIZZINESS AND GIDDINESS: ICD-10-CM

## 2021-08-09 DIAGNOSIS — G47.33 OBSTRUCTIVE SLEEP APNEA (ADULT) (PEDIATRIC): ICD-10-CM

## 2021-08-09 PROCEDURE — 92537 CALORIC VSTBLR TEST W/REC: CPT

## 2021-08-09 PROCEDURE — 92540 BASIC VESTIBULAR EVALUATION: CPT

## 2021-08-09 PROCEDURE — 99213 OFFICE O/P EST LOW 20 MIN: CPT

## 2021-08-09 RX ORDER — METHYLPHENIDATE HYDROCHLORIDE 10 MG/1
10 CAPSULE, EXTENDED RELEASE ORAL
Qty: 30 | Refills: 0 | Status: ACTIVE | COMMUNITY
Start: 2021-07-19

## 2021-08-09 RX ORDER — PANTOPRAZOLE 40 MG/1
40 TABLET, DELAYED RELEASE ORAL
Qty: 90 | Refills: 0 | Status: ACTIVE | COMMUNITY
Start: 2021-06-25

## 2021-08-09 RX ORDER — OXYCODONE AND ACETAMINOPHEN 5; 325 MG/1; MG/1
5-325 TABLET ORAL
Qty: 18 | Refills: 0 | Status: ACTIVE | COMMUNITY
Start: 2021-03-14

## 2021-08-09 RX ORDER — METHYLPHENIDATE HYDROCHLORIDE 10 MG/1
10 TABLET ORAL
Qty: 60 | Refills: 0 | Status: ACTIVE | COMMUNITY
Start: 2021-04-28

## 2021-08-09 NOTE — HISTORY OF PRESENT ILLNESS
[de-identified] : 45M who returns for follow up of dizziness. Patient denies any hearing changes, tinnitus, otalgia, otorrhea. He explains his dizziness has since subsided. No new ENT complaints. No pertinent FH/SH.

## 2021-08-09 NOTE — DATA REVIEWED
[de-identified] : ANN-MARIE- FAWAD reviewed with pt [de-identified] : mri reviewed with pt l maxillary retention cyst

## 2021-08-09 NOTE — ASSESSMENT
[FreeTextEntry1] : 45M who returns for follow up of dizziness. Patient denies any further dizziness. each episode had a distinct reason he attributes it to. reviewed test results with pt\par \par \par Plan:\par - f/u if recurs

## 2021-08-12 ENCOUNTER — APPOINTMENT (OUTPATIENT)
Dept: THORACIC SURGERY | Facility: CLINIC | Age: 46
End: 2021-08-12
Payer: COMMERCIAL

## 2021-09-02 ENCOUNTER — APPOINTMENT (OUTPATIENT)
Dept: THORACIC SURGERY | Facility: CLINIC | Age: 46
End: 2021-09-02
Payer: COMMERCIAL

## 2021-09-30 ENCOUNTER — APPOINTMENT (OUTPATIENT)
Dept: THORACIC SURGERY | Facility: CLINIC | Age: 46
End: 2021-09-30
Payer: COMMERCIAL

## 2021-09-30 ENCOUNTER — NON-APPOINTMENT (OUTPATIENT)
Age: 46
End: 2021-09-30

## 2021-09-30 VITALS
DIASTOLIC BLOOD PRESSURE: 79 MMHG | HEIGHT: 72 IN | BODY MASS INDEX: 25.6 KG/M2 | HEART RATE: 82 BPM | OXYGEN SATURATION: 95 % | SYSTOLIC BLOOD PRESSURE: 110 MMHG | WEIGHT: 189 LBS | TEMPERATURE: 97.1 F | RESPIRATION RATE: 17 BRPM

## 2021-09-30 DIAGNOSIS — L76.82 OTHER POSTPROCEDURAL COMPLICATIONS OF SKIN AND SUBCUTANEOUS TISSUE: ICD-10-CM

## 2021-09-30 PROCEDURE — 99213 OFFICE O/P EST LOW 20 MIN: CPT

## 2021-09-30 NOTE — PHYSICAL EXAM
[Neck Appearance] : the appearance of the neck was normal [Neck Cervical Mass (___cm)] : no neck mass was observed [] : no respiratory distress [Respiration, Rhythm And Depth] : normal respiratory rhythm and effort [Exaggerated Use Of Accessory Muscles For Inspiration] : no accessory muscle use [Examination Of The Chest] : the chest was normal in appearance [Oriented To Time, Place, And Person] : oriented to person, place, and time [Impaired Insight] : insight and judgment were intact [Affect] : the affect was normal

## 2021-10-01 NOTE — ASSESSMENT
[FreeTextEntry1] : 45 y/o nonsmoker with a PMH of ADD, Lyme disease, multinodular goiter s/p left thyroid lobectomy at the age of 15 for nonmalignant goiter, pneumonia, testicular cancer (2004), Scatsky's ring/gastritis (1998), and PE in 2019. Since his progressive growth of the right thyroid lobe he has had a globus sensation. He was scheduled to have a completion thyroidectomy for a right sided goiter in 2017, however was delayed. 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 He is s/p completion R thyroidectomy and substernal goiter resection with nidia-sternotomy on 03/12/21. He presents today for a follow up visit. \par \par Today the patient reports feeling generally well. He denies pain, fever, and SOB. Since losing weight, he admits to sternal discomfort from the midsternal wires.\par \par The sternum is stable, and have discussed with the patient proceeding with removal of symptomatic painful sternal wires. Risks, benefits and alternatives were explained to the patient, understands and agrees to the above.\par \par PLAN:\par 1. sternal wire removal on 11/8/21.\par 2.  medical clearance and PST labs, UA ,EKG.

## 2021-10-01 NOTE — HISTORY OF PRESENT ILLNESS
[FreeTextEntry1] : 47 y/o nonsmoker with a PMH of ADD, Lyme disease, multinodular goiter s/p left thyroid lobectomy at the age of 15 for nonmalignant goiter, pneumonia, testicular cancer (2004), Scatsky's ring/gastritis (1998), and PE in 2019. Since his progressive growth of the right thyroid lobe he has had a globus sensation. He was scheduled to have a completion thyroidectomy for a right sided goiter in 2017, however was delayed. 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 He is s/p completion R thyroidectomy and substernal goiter resection with nidia-sternotomy on 03/12/21. He presents today for a follow up visit. He is being referred by Dr. Konstantin Varela. \par \par PFT done on 11/21/19 showed FEV1 = 5.41, 121% of predicted value, FVC = 6.58, 120% of predicted value, PEF = 7.50, 76% of predicted value and DLCO = 31.9, 107% of predicted value. \par \par CTA chest completed on 03/02/21:\par -no significant interval change in the multinodular goiter with retro/substernal extension \par

## 2021-10-01 NOTE — END OF VISIT
[FreeTextEntry3] : I, MANAV MARES , am scribing for and in the presence of GASTON HESS the following sections: history of present illness, past medical/family/surgical/family/social history, review of systems, vital signs, physical exam, and disposition.\par \par

## 2021-11-23 VITALS
RESPIRATION RATE: 16 BRPM | TEMPERATURE: 98 F | HEIGHT: 73 IN | WEIGHT: 199.3 LBS | HEART RATE: 82 BPM | SYSTOLIC BLOOD PRESSURE: 116 MMHG | OXYGEN SATURATION: 97 % | DIASTOLIC BLOOD PRESSURE: 75 MMHG

## 2021-11-23 NOTE — PATIENT PROFILE ADULT - INTERNATIONAL TRAVEL
Results are back and normal.   Mother aware.  They have an appointment on 3/30 with Dr. Heath and will discuss then.    No Yes

## 2021-11-23 NOTE — ASU PATIENT PROFILE, ADULT - NSICDXPASTMEDICALHX_GEN_ALL_CORE_FT
PAST MEDICAL HISTORY:  Acid reflux     Attention deficit hyperactivity disorder (ADHD)     Bipolar disorder     H/O: depression     HTN (hypertension)     Lower back pain     Nontoxic goiter     PE (pulmonary thromboembolism) 2019    Testicular cancer 2004 s/p RT    Vertigo

## 2021-11-23 NOTE — ASU PATIENT PROFILE, ADULT - NSICDXPASTSURGICALHX_GEN_ALL_CORE_FT
PAST SURGICAL HISTORY:  S/P orchiectomy lap    S/P partial thyroidectomy left, 1989 ans completion in 2021    S/P tonsillectomy and adenoidectomy

## 2021-11-26 ENCOUNTER — NON-APPOINTMENT (OUTPATIENT)
Age: 46
End: 2021-11-26

## 2021-11-26 ENCOUNTER — APPOINTMENT (OUTPATIENT)
Dept: DISASTER EMERGENCY | Facility: CLINIC | Age: 46
End: 2021-11-26

## 2021-11-27 LAB — SARS-COV-2 N GENE NPH QL NAA+PROBE: NOT DETECTED

## 2021-11-28 ENCOUNTER — TRANSCRIPTION ENCOUNTER (OUTPATIENT)
Age: 46
End: 2021-11-28

## 2021-11-29 ENCOUNTER — TRANSCRIPTION ENCOUNTER (OUTPATIENT)
Age: 46
End: 2021-11-29

## 2021-11-29 ENCOUNTER — RESULT REVIEW (OUTPATIENT)
Age: 46
End: 2021-11-29

## 2021-11-29 ENCOUNTER — OUTPATIENT (OUTPATIENT)
Dept: INPATIENT UNIT | Facility: HOSPITAL | Age: 46
LOS: 1 days | Discharge: HOME | End: 2021-11-29
Payer: COMMERCIAL

## 2021-11-29 ENCOUNTER — APPOINTMENT (OUTPATIENT)
Dept: THORACIC SURGERY | Facility: HOSPITAL | Age: 46
End: 2021-11-29

## 2021-11-29 VITALS
OXYGEN SATURATION: 99 % | RESPIRATION RATE: 16 BRPM | DIASTOLIC BLOOD PRESSURE: 77 MMHG | SYSTOLIC BLOOD PRESSURE: 116 MMHG | TEMPERATURE: 98 F | HEART RATE: 69 BPM

## 2021-11-29 DIAGNOSIS — Z90.79 ACQUIRED ABSENCE OF OTHER GENITAL ORGAN(S): Chronic | ICD-10-CM

## 2021-11-29 DIAGNOSIS — E89.0 POSTPROCEDURAL HYPOTHYROIDISM: Chronic | ICD-10-CM

## 2021-11-29 DIAGNOSIS — Z90.89 ACQUIRED ABSENCE OF OTHER ORGANS: Chronic | ICD-10-CM

## 2021-11-29 LAB
BLD GP AB SCN SERPL QL: NEGATIVE — SIGNIFICANT CHANGE UP
RH IG SCN BLD-IMP: POSITIVE — SIGNIFICANT CHANGE UP

## 2021-11-29 PROCEDURE — 71045 X-RAY EXAM CHEST 1 VIEW: CPT | Mod: 26

## 2021-11-29 PROCEDURE — 88302 TISSUE EXAM BY PATHOLOGIST: CPT | Mod: 26

## 2021-11-29 PROCEDURE — 20670 REMOVAL IMPLANT SUPERFICIAL: CPT

## 2021-11-29 PROCEDURE — 88300 SURGICAL PATH GROSS: CPT | Mod: 26,59

## 2021-11-29 RX ORDER — HYDROMORPHONE HYDROCHLORIDE 2 MG/ML
0.25 INJECTION INTRAMUSCULAR; INTRAVENOUS; SUBCUTANEOUS ONCE
Refills: 0 | Status: DISCONTINUED | OUTPATIENT
Start: 2021-11-29 | End: 2021-11-29

## 2021-11-29 RX ORDER — METHYLPHENIDATE HCL 5 MG
1 TABLET ORAL
Qty: 0 | Refills: 0 | DISCHARGE

## 2021-11-29 RX ORDER — DEXTROAMPHETAMINE SACCHARATE, AMPHETAMINE ASPARTATE, DEXTROAMPHETAMINE SULFATE AND AMPHETAMINE SULFATE 1.875; 1.875; 1.875; 1.875 MG/1; MG/1; MG/1; MG/1
1 TABLET ORAL
Qty: 0 | Refills: 0 | DISCHARGE

## 2021-11-29 RX ORDER — BUPIVACAINE 13.3 MG/ML
20 INJECTION, SUSPENSION, LIPOSOMAL INFILTRATION ONCE
Refills: 0 | Status: DISCONTINUED | OUTPATIENT
Start: 2021-11-29 | End: 2021-11-29

## 2021-11-29 RX ORDER — LEVOTHYROXINE SODIUM 125 MCG
1 TABLET ORAL
Qty: 0 | Refills: 0 | DISCHARGE

## 2021-11-29 RX ORDER — ASPIRIN/CALCIUM CARB/MAGNESIUM 324 MG
0 TABLET ORAL
Qty: 0 | Refills: 0 | DISCHARGE

## 2021-11-29 RX ORDER — ACETAMINOPHEN 500 MG
975 TABLET ORAL ONCE
Refills: 0 | Status: DISCONTINUED | OUTPATIENT
Start: 2021-11-29 | End: 2021-11-29

## 2021-11-29 RX ORDER — FLUTICASONE PROPIONATE 50 MCG
1 SPRAY, SUSPENSION NASAL
Qty: 0 | Refills: 0 | DISCHARGE

## 2021-11-29 RX ORDER — KETOROLAC TROMETHAMINE 30 MG/ML
15 SYRINGE (ML) INJECTION ONCE
Refills: 0 | Status: DISCONTINUED | OUTPATIENT
Start: 2021-11-29 | End: 2021-11-29

## 2021-11-29 NOTE — DISCHARGE NOTE PROVIDER - CARE PROVIDER_API CALL
Clark Tinsley (MD)  Surgery; Thoracic Surgery  [address]  [place], [place] [address]  Phone: [phone]  Fax: [phone]  Follow Up Time: 2 weeks

## 2021-11-29 NOTE — DISCHARGE NOTE PROVIDER - NSDCMRMEDTOKEN_GEN_ALL_CORE_FT
Adderall XR 20 mg oral capsule, extended release: 1 cap(s) orally once a day (in the morning)  aspirin 81 mg oral tablet: orally 2 times a day  famotidine 40 mg oral tablet: 1 tab(s) orally once a day (at bedtime)  fluticasone 50 mcg/inh nasal spray: 1 spray(s) nasal 2 times a day  lamoTRIgine 100 mg oral tablet: 1 tab(s) orally once a day  lisinopril 10 mg oral tablet: 1 tab(s) orally once a day  Synthroid 150 mcg (0.15 mg) oral tablet: 1 tab(s) orally once a day

## 2021-11-29 NOTE — DISCHARGE NOTE PROVIDER - NSDCFUADDAPPT_GEN_ALL_CORE_FT
If you do not hear from our office regarding a follow up appointment by Wednesday 12/1/21, please call [phone] to schedule an appointment.

## 2021-11-29 NOTE — DISCHARGE NOTE PROVIDER - NSDCCPCAREPLAN_GEN_ALL_CORE_FT
PRINCIPAL DISCHARGE DIAGNOSIS  Diagnosis: Midline sternotomy scar  Assessment and Plan of Treatment: You had sternal wires removed due to pain

## 2021-11-29 NOTE — PACU DISCHARGE NOTE - COMMENTS
discharged pt to lobby via ambulatory...accompanied by pca and Pt's wife who is awaiting pt in the lobby..

## 2021-11-29 NOTE — DISCHARGE NOTE NURSING/CASE MANAGEMENT/SOCIAL WORK - NSDCFUADDAPPT_GEN_ALL_CORE_FT
If you do not hear from our office regarding a follow up appointment by Wednesday 12/1/21, please call [phone] to schedule an appointment.

## 2021-11-29 NOTE — DISCHARGE NOTE PROVIDER - HOSPITAL COURSE
Mr. Clarke is a 46 year old with a history of ADD, Lyme disease, testicular cancer (2004), Scatsky's ring/gastritis (1998), PE in 2019, multinodular goiter s/p left thyroid lobectomy at the age of 15 for nonmalignant goiter and completion right thyroidectomy and substernal goiter resection with hemisternotomy on 3/12/21 with Dr. Varela and Dr. Tinsley who presents today for sternal wire removal. He had been experiencing pain around sternal wires. In the operating room ---------   Mr. Clarke is a 46 year old with a history of ADD, Lyme disease, testicular cancer (2004), Scatsky's ring/gastritis (1998), PE in 2019, multinodular goiter s/p left thyroid lobectomy at the age of 15 for nonmalignant goiter and completion right thyroidectomy and substernal goiter resection with hemisternotomy on 3/12/21 with Dr. Varela and Dr. Tinsley who presents today for sternal wire removal. He had been experiencing pain around sternal wires. He was evaluated for sternal wire removal and deemed a good candidate. On 11/29/21, he underwent a sternal wire removal. Three wires were identified and removed. Incision was closed in layers. Dermabond applied. He was taken to the PACU following the procedure. Chest XR was done. He was cleared for discharge.     35 minutes was spent with the patient reviewing the discharge material including medications, follow up appointments, recovery, concerning symptoms, and how to contact their health care providers if they have questions

## 2021-11-29 NOTE — DISCHARGE NOTE PROVIDER - NSDCCPTREATMENT_GEN_ALL_CORE_FT
PRINCIPAL PROCEDURE  Procedure: Removal or replacement, sternal wires  Findings and Treatment:

## 2021-11-29 NOTE — DISCHARGE NOTE NURSING/CASE MANAGEMENT/SOCIAL WORK - PATIENT PORTAL LINK FT
You can access the FollowMyHealth Patient Portal offered by [facility] by registering at the following website: [email or web address]. By joining [employer]’s FollowMyHealth portal, you will also be able to view your health information using other applications (apps) compatible with our system.

## 2021-11-29 NOTE — DISCHARGE NOTE PROVIDER - NSDCFUADDINST_GEN_ALL_CORE_FT
Pain control:  - Take Tylenol 975mg (3 - 325mg tabs) every 6 hours for the first 36-48 hours from surgery. This will help decrease any postoperative pain you have.  - You may take ibuprofen 400mg every 4-6 hours as needed for any other pain.      -Gently clean your incisions with anti-bacterial soap and water, pat dry.  You may leave them open to air.    -Call your doctor if you have shortness of breath, chest pain not relieved by pain medication, dizziness, fever >101.5, or increased redness or drainage from incisions.

## 2021-12-07 LAB — SURGICAL PATHOLOGY STUDY: SIGNIFICANT CHANGE UP

## 2021-12-16 ENCOUNTER — APPOINTMENT (OUTPATIENT)
Dept: THORACIC SURGERY | Facility: CLINIC | Age: 46
End: 2021-12-16
Payer: COMMERCIAL

## 2021-12-16 ENCOUNTER — OUTPATIENT (OUTPATIENT)
Dept: OUTPATIENT SERVICES | Facility: HOSPITAL | Age: 46
LOS: 1 days | End: 2021-12-16
Payer: COMMERCIAL

## 2021-12-16 DIAGNOSIS — Z90.79 ACQUIRED ABSENCE OF OTHER GENITAL ORGAN(S): Chronic | ICD-10-CM

## 2021-12-16 DIAGNOSIS — Z09 ENCOUNTER FOR FOLLOW-UP EXAMINATION AFTER COMPLETED TREATMENT FOR CONDITIONS OTHER THAN MALIGNANT NEOPLASM: ICD-10-CM

## 2021-12-16 DIAGNOSIS — E89.0 POSTPROCEDURAL HYPOTHYROIDISM: Chronic | ICD-10-CM

## 2021-12-16 DIAGNOSIS — Z90.89 ACQUIRED ABSENCE OF OTHER ORGANS: Chronic | ICD-10-CM

## 2021-12-16 PROBLEM — R42 DIZZINESS AND GIDDINESS: Chronic | Status: ACTIVE | Noted: 2021-11-23

## 2021-12-16 PROBLEM — Z86.59 PERSONAL HISTORY OF OTHER MENTAL AND BEHAVIORAL DISORDERS: Chronic | Status: ACTIVE | Noted: 2021-11-23

## 2021-12-16 PROBLEM — F90.9 ATTENTION-DEFICIT HYPERACTIVITY DISORDER, UNSPECIFIED TYPE: Chronic | Status: ACTIVE | Noted: 2021-11-23

## 2021-12-16 PROBLEM — C62.90 MALIGNANT NEOPLASM OF UNSPECIFIED TESTIS, UNSPECIFIED WHETHER DESCENDED OR UNDESCENDED: Chronic | Status: ACTIVE | Noted: 2020-01-27

## 2021-12-16 PROBLEM — I26.99 OTHER PULMONARY EMBOLISM WITHOUT ACUTE COR PULMONALE: Chronic | Status: ACTIVE | Noted: 2020-01-27

## 2021-12-16 PROCEDURE — 71046 X-RAY EXAM CHEST 2 VIEWS: CPT

## 2021-12-16 PROCEDURE — 99212 OFFICE O/P EST SF 10 MIN: CPT

## 2021-12-16 PROCEDURE — 71046 X-RAY EXAM CHEST 2 VIEWS: CPT | Mod: 26

## 2021-12-16 NOTE — PHYSICAL EXAM
[] : no respiratory distress [Respiration, Rhythm And Depth] : normal respiratory rhythm and effort [Exaggerated Use Of Accessory Muscles For Inspiration] : no accessory muscle use [FreeTextEntry1] : midsternal incisions with no signs or symptoms of infection

## 2021-12-16 NOTE — ASSESSMENT
[FreeTextEntry1] : Patient is doing well status post removal of wires on 11/29/21.  Pathology was unremarkable. Incision with no signs or symptoms of infection.\par \par Patient has no complaints.  Chest x-ray was performed today which is also unremarkable.\par \par The patient is discharged from the thoracic surgical office.\par \par PLAN:\par 1. Follow up PRN

## 2021-12-16 NOTE — HISTORY OF PRESENT ILLNESS
[FreeTextEntry1] : 45 y/o nonsmoker with a PMH of ADD, Lyme disease, multinodular goiter s/p left thyroid lobectomy at the age of 15 for nonmalignant goiter, pneumonia, testicular cancer (2004), Scatsky's ring/gastritis (1998), and PE in 2019. Since his progressive growth of the right thyroid lobe he has had a globus sensation. He was scheduled to have a completion thyroidectomy for a right sided goiter in 2017, however was delayed. 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 He is s/p completion R thyroidectomy and substernal goiter resection with nidia-sternotomy on 03/12/21. He presents today for after his sternal wire removal on 11/29/21. He is being referred by Dr. Konstantin Varela. He presents today with a CXR. \par \par PFT done on 11/21/19 showed FEV1 = 5.41, 121% of predicted value, FVC = 6.58, 120% of predicted value, PEF = 7.50, 76% of predicted value and DLCO = 31.9, 107% of predicted value. \par \par CTA chest completed on 03/02/21:\par -no significant interval change in the multinodular goiter with retro/substernal extension \par \par

## 2021-12-17 PROCEDURE — C9399: CPT

## 2021-12-17 PROCEDURE — 86900 BLOOD TYPING SEROLOGIC ABO: CPT

## 2021-12-17 PROCEDURE — 88300 SURGICAL PATH GROSS: CPT

## 2021-12-17 PROCEDURE — 86850 RBC ANTIBODY SCREEN: CPT

## 2021-12-17 PROCEDURE — 20670 REMOVAL IMPLANT SUPERFICIAL: CPT

## 2021-12-17 PROCEDURE — 71045 X-RAY EXAM CHEST 1 VIEW: CPT

## 2021-12-17 PROCEDURE — 86901 BLOOD TYPING SEROLOGIC RH(D): CPT

## 2021-12-17 PROCEDURE — 88302 TISSUE EXAM BY PATHOLOGIST: CPT

## 2022-03-25 ENCOUNTER — RX RENEWAL (OUTPATIENT)
Age: 47
End: 2022-03-25

## 2022-03-25 RX ORDER — LEVOTHYROXINE SODIUM 150 UG/1
150 TABLET ORAL DAILY
Qty: 90 | Refills: 3 | Status: ACTIVE | COMMUNITY
Start: 2021-03-18 | End: 1900-01-01

## 2022-04-11 PROBLEM — J04.0 REFLUX LARYNGITIS: Status: ACTIVE | Noted: 2021-03-18

## 2022-04-11 PROBLEM — J04.0 REFLUX LARYNGITIS: Status: RESOLVED | Noted: 2017-05-03 | Resolved: 2021-03-18

## 2023-10-01 PROBLEM — Z92.3 HISTORY OF RADIATION THERAPY: Status: RESOLVED | Noted: 2017-05-03 | Resolved: 2023-10-01

## 2024-05-01 NOTE — ASU PREOP CHECKLIST - 1.
Spoke with the pt niece, she verbalized understanding and schedule labs for 2 weeks for recheck on magnesium.   pt is aware of visitation policy
